# Patient Record
Sex: FEMALE | Race: ASIAN | NOT HISPANIC OR LATINO | Employment: UNEMPLOYED | URBAN - METROPOLITAN AREA
[De-identification: names, ages, dates, MRNs, and addresses within clinical notes are randomized per-mention and may not be internally consistent; named-entity substitution may affect disease eponyms.]

---

## 2024-01-12 ENCOUNTER — OFFICE VISIT (OUTPATIENT)
Dept: INTERNAL MEDICINE | Facility: CLINIC | Age: 49
End: 2024-01-12
Payer: COMMERCIAL

## 2024-01-12 VITALS
TEMPERATURE: 97.5 F | SYSTOLIC BLOOD PRESSURE: 104 MMHG | HEART RATE: 70 BPM | OXYGEN SATURATION: 99 % | WEIGHT: 151 LBS | BODY MASS INDEX: 25.78 KG/M2 | HEIGHT: 64 IN | DIASTOLIC BLOOD PRESSURE: 72 MMHG

## 2024-01-12 DIAGNOSIS — Z82.49 FAMILY HISTORY OF HEART DISEASE: ICD-10-CM

## 2024-01-12 DIAGNOSIS — E11.9 ENCOUNTER FOR DIABETIC FOOT EXAM: ICD-10-CM

## 2024-01-12 DIAGNOSIS — E53.8 VITAMIN B12 DEFICIENCY: ICD-10-CM

## 2024-01-12 DIAGNOSIS — Z12.4 ENCOUNTER FOR PAPANICOLAOU SMEAR OF CERVIX: ICD-10-CM

## 2024-01-12 DIAGNOSIS — E11.9 TYPE 2 DIABETES MELLITUS WITHOUT COMPLICATION, WITHOUT LONG-TERM CURRENT USE OF INSULIN: Primary | ICD-10-CM

## 2024-01-12 DIAGNOSIS — Z00.00 ANNUAL PHYSICAL EXAM: ICD-10-CM

## 2024-01-12 DIAGNOSIS — E61.2 MAGNESIUM DEFICIENCY: ICD-10-CM

## 2024-01-12 RX ORDER — EMPAGLIFLOZIN 10 MG/1
1 TABLET, FILM COATED ORAL DAILY
COMMUNITY

## 2024-01-12 RX ORDER — GLIPIZIDE 10 MG/1
TABLET, FILM COATED, EXTENDED RELEASE ORAL
COMMUNITY

## 2024-01-12 RX ORDER — NORETHINDRONE ACETATE AND ETHINYL ESTRADIOL, ETHINYL ESTRADIOL AND FERROUS FUMARATE 1MG-10(24)
KIT ORAL DAILY
COMMUNITY

## 2024-01-12 NOTE — PROGRESS NOTES
[facility]  [address]  [facility] [address]  Dept: [phone]  Dept Fax: [phone]  Loc: [phone]  Loc Fax: [phone]    Sada Ugalde  1975    New Patient Office Note    History of Present Illness:  Patient is a 48-year-old female in today to establish care for diabetes, vitamin B12 deficiency, and folate deficiency.  Patient on glipizide 10 mg extended release daily, Jardiance 10 mg daily, and metformin 1000 mg twice daily.  Taking as directed with any problems or side effects.  Blood sugars running normal.  Needing A1c rechecked.    Patient has a family history for cardiovascular disease.  Would like referred to cardiology for further evaluation.    Patient overall doing well and feeling well.        The following portions of the patient's history were reviewed and updated as appropriate: allergies, current medications, past family history, past medical history, past social history, past surgical history, and problem list.    Medications:    Current Outpatient Medications:     glipizide (GLUCOTROL XL) 10 MG 24 hr tablet, TAKE 1 TABLET BY MOUTH ONCE A DAY WITH BREAKFAST, Disp: , Rfl:     Jardiance 10 MG tablet tablet, Take 1 tablet by mouth Daily., Disp: , Rfl:     metFORMIN (GLUCOPHAGE) 1000 MG tablet, Take 1 tablet by mouth 2 (Two) Times a Day With Meals., Disp: , Rfl:     Norethin-Eth Estrad-Fe Biphas (Lo Loestrin Fe) 1 MG-10 MCG / 10 MCG tablet, Take  by mouth Daily., Disp: , Rfl:     Subjective  No Known Allergies     Past Medical History:   Diagnosis Date    Type 2 diabetes mellitus        Past Surgical History:   Procedure Laterality Date     SECTION         Family History   Problem Relation Age of Onset    Diabetes Father     Diabetes Brother         Social History     Socioeconomic History    Marital status:    Tobacco Use    Smoking status: Never    Smokeless tobacco: Never   Vaping Use    Vaping Use: Never  "used   Substance and Sexual Activity    Alcohol use: Yes     Alcohol/week: 1.0 standard drink of alcohol     Types: 1 Drinks containing 0.5 oz of alcohol per week     Comment: social    Drug use: Never    Sexual activity: Defer       Review of Systems   Constitutional:  Negative for activity change, chills, fatigue, fever and unexpected weight change.   HENT:  Negative for congestion, ear pain, postnasal drip, sinus pressure and sore throat.    Eyes:  Negative for pain, discharge and redness.   Respiratory:  Negative for cough, shortness of breath and wheezing.    Cardiovascular:  Negative for chest pain, palpitations and leg swelling.   Gastrointestinal:  Negative for diarrhea, nausea and vomiting.   Endocrine: Negative for cold intolerance and heat intolerance.   Genitourinary:  Negative for decreased urine volume and dysuria.   Musculoskeletal:  Negative for arthralgias and myalgias.   Skin:  Negative for rash and wound.   Neurological:  Negative for dizziness, light-headedness and headaches.   Hematological:  Does not bruise/bleed easily.   Psychiatric/Behavioral:  Negative for confusion, dysphoric mood and sleep disturbance. The patient is not nervous/anxious.        Objective  Vitals:    01/12/24 1120   BP: 104/72   BP Location: Left arm   Patient Position: Sitting   Cuff Size: Adult   Pulse: 70   Temp: 97.5 °F (36.4 °C)   TempSrc: Temporal   SpO2: 99%   Weight: 68.5 kg (151 lb)   Height: 162.6 cm (64\")       Physical Exam  Physical Exam  Vitals and nursing note reviewed.   Constitutional:       General: She is not in acute distress.     Appearance: She is not ill-appearing.   HENT:      Head: Normocephalic.      Right Ear: Tympanic membrane, ear canal and external ear normal. There is no impacted cerumen.      Left Ear: Tympanic membrane, ear canal and external ear normal. There is no impacted cerumen.      Nose: No congestion or rhinorrhea.      Mouth/Throat:      Mouth: Mucous membranes are moist.      " Pharynx: Oropharynx is clear. No oropharyngeal exudate or posterior oropharyngeal erythema.   Eyes:      General:         Right eye: No discharge.         Left eye: No discharge.      Extraocular Movements: Extraocular movements intact.      Conjunctiva/sclera: Conjunctivae normal.      Pupils: Pupils are equal, round, and reactive to light.   Cardiovascular:      Rate and Rhythm: Normal rate and regular rhythm.      Heart sounds: Normal heart sounds. No murmur heard.     No friction rub. No gallop.   Pulmonary:      Effort: Pulmonary effort is normal. No respiratory distress.      Breath sounds: Normal breath sounds. No wheezing.   Abdominal:      General: Bowel sounds are normal. There is no distension.      Palpations: Abdomen is soft. There is no mass.      Tenderness: There is no abdominal tenderness.   Musculoskeletal:         General: No swelling. Normal range of motion.      Cervical back: Normal range of motion. No tenderness.      Right lower leg: No edema.      Left lower leg: No edema.   Lymphadenopathy:      Cervical: No cervical adenopathy.   Skin:     Findings: No bruising, erythema or rash.   Neurological:      Mental Status: She is oriented to person, place, and time.      Gait: Gait normal.   Psychiatric:         Mood and Affect: Mood normal.         Behavior: Behavior normal.         Thought Content: Thought content normal.         Judgment: Judgment normal.         Diagnostic Data  Procedures    Assessment  Diagnoses and all orders for this visit:    1. Type 2 diabetes mellitus without complication, without long-term current use of insulin (Primary)  -     MicroAlbumin, Urine, Random - Urine, Clean Catch; Future    2. Vitamin B12 deficiency  -     Vitamin B12; Future  -     Folate; Future    3. Magnesium deficiency  -     Magnesium; Future    4. Family history of heart disease  -     Ambulatory Referral to Cardiology    5. Encounter for Papanicolaou smear of cervix  -     Ambulatory Referral to  Obstetrics / Gynecology    6. Annual physical exam  -     CBC (No Diff)  -     Comprehensive Metabolic Panel  -     TSH Rfx On Abnormal To Free T4  -     Hemoglobin A1c; Future  -     Lipid Panel  -     Hepatitis C Antibody    7. Encounter for diabetic foot exam        Plan    1. Type 2 diabetes mellitus without complication, without long-term current use of insulin (Primary)- on glipizide, Jardiance, metformin.  Refilled meds.  Repeat A1c.  Continue to monitor.    2. Vitamin B12 deficiency repeat vitamin B12 level.  Obtain folate level.    3. Magnesium deficiency repeat magnesium level.    4. Family history of heart disease- Ambulatory Referral to Cardiology    5. Encounter for Papanicolaou smear of cervix- Ambulatory Referral to Obstetrics / Gynecology    6. Annual physical exam- obtain fasting labs and follow-up with these.  Advised on nutrition and exercise and follow-up with this.    7.  Encounter for diabetic foot exam- completed today.  Within normal limits.      Return in about 3 months (around 4/12/2024) for Recheck.    Zaki Clay PA-C  01/12/2024

## 2024-01-15 RX ORDER — EMPAGLIFLOZIN 10 MG/1
10 TABLET, FILM COATED ORAL DAILY
Qty: 30 TABLET | Status: CANCELLED | OUTPATIENT
Start: 2024-01-15

## 2024-01-15 NOTE — TELEPHONE ENCOUNTER
Caller: Sada Ugalde    Relationship: Self    Best call back number: [phone]    Requested Prescriptions:   Requested Prescriptions     Pending Prescriptions Disp Refills    Jardiance 10 MG tablet tablet 30 tablet      Sig: Take 1 tablet by mouth Daily.        Pharmacy where request should be sent:    [facility] [phone]  Last office visit with prescribing clinician: 1/12/2024   Last telemedicine visit with prescribing clinician: Visit date not found   Next office visit with prescribing clinician: 4/12/2024     Additional details provided by patient: PATIENT TAKES 25MG BID AND NEEDS NEW SCRIPT SENT TO PHARMACY. PATIENT HAS 2 PILLS LEFT.     Does the patient have less than a 3 day supply:  [x] Yes  [] No    Would you like a call back once the refill request has been completed: [x] Yes [] No    If the office needs to give you a call back, can they leave a voicemail: [x] Yes [] No    Ese Atwood Rep   01/15/24 13:19 EST

## 2024-01-17 NOTE — TELEPHONE ENCOUNTER
PT CALLED THE OFFICE BACK AD WAS ADVISED SHE ONLY NEEDS TO BE TAKING 25MG INSTEAD OF 50MG A DAY. PT UNDERSTOOD, BUT WAS A LITTLE CONCERNED SINCE SHE HAS BEEN TAKING THE 50MG DAILY IN THE PAST. PT DOESN'T HAVE ANY SYMPTOMS, SO I TOLD HER TO JUST START TAKING THE 25MG AND SHE SHOULD BE FINE, BUT IF SHE STARTS HAVING ISSUES TO CALL OUR OFFICE BACK. PT UNDERSTOOD, JUST A FYI.

## 2024-01-30 ENCOUNTER — OFFICE VISIT (OUTPATIENT)
Dept: CARDIOLOGY | Facility: CLINIC | Age: 49
End: 2024-01-30
Payer: COMMERCIAL

## 2024-01-30 VITALS
SYSTOLIC BLOOD PRESSURE: 106 MMHG | HEART RATE: 77 BPM | BODY MASS INDEX: 26.12 KG/M2 | OXYGEN SATURATION: 98 % | HEIGHT: 64 IN | WEIGHT: 153 LBS | DIASTOLIC BLOOD PRESSURE: 68 MMHG

## 2024-01-30 DIAGNOSIS — E11.9 TYPE 2 DIABETES MELLITUS WITHOUT COMPLICATION, WITHOUT LONG-TERM CURRENT USE OF INSULIN: Primary | ICD-10-CM

## 2024-01-30 PROCEDURE — 99203 OFFICE O/P NEW LOW 30 MIN: CPT | Performed by: INTERNAL MEDICINE

## 2024-01-30 RX ORDER — LANCETS 33 GAUGE
EACH MISCELLANEOUS AS NEEDED
COMMUNITY
Start: 2023-11-06

## 2024-01-30 NOTE — PROGRESS NOTES
"[facility] Cardiology  Consultation H&P  Sada Ugalde  1975  [phone]  There is no work phone number on file..    VISIT DATE:  01/30/2024    PCP: Zaki Clay PA-C  [address] [place], [place] [address]    CC:  Chief Complaint   Patient presents with    Establish Care     New Patient         ASSESSMENT:   Diagnosis Plan   1. Type 2 diabetes mellitus without complication, without long-term current use of insulin              PLAN:  Type 2 diabetes mellitus: Currently well-controlled.  Agree with current medical therapy.  Afterload well-controlled.  Continue heart healthy diet and regular exercise.  Fasting lipid panel pending, goal LDL less than 100, goal HDL greater than 50, goal triglyceride less than 150.    History of Present Illness   48-year-old female with type 2 diabetes since 2008 and family history of early coronary disease in her father and paternal aunt.  Reports that most recent hemoglobin A1c 6.2.  Blood pressures running less than 130/80 mmHg.  Previous lipid profile not available for review, repeat labs pending with primary care physician in April.  Recently moved to [place] from [place].  Compliant with medical therapy.  , is able to exercise without any limitations.  Denies chest pain, palpitations or dyspnea on exertion.    PHYSICAL EXAMINATION:  Vitals:    01/30/24 1257   BP: 106/68   BP Location: Right arm   Patient Position: Sitting   Pulse: 77   SpO2: 98%   Weight: 69.4 kg (153 lb)   Height: 162.6 cm (64\")     General Appearance:    Alert, cooperative, no distress, appears stated age   Head:    Normocephalic, without obvious abnormality, atraumatic   Eyes:    conjunctiva/corneas clear, EOM's intact, fundi     benign, both eyes   Ears:    Normal TM's and external ear canals, both ears   Nose:   Nares normal, septum midline, mucosa normal, no drainage    or sinus tenderness   Throat:   Lips, mucosa, and tongue normal; " "teeth and gums normal   Neck:   Supple, symmetrical, trachea midline, no adenopathy;     thyroid:  no enlargement/tenderness/nodules; no carotid    bruit or JVD   Back:     Symmetric, no curvature, ROM normal, no CVA tenderness   Lungs:     Clear to auscultation bilaterally, respirations unlabored   Chest Wall:    No tenderness or deformity    Heart:    Regular rate and rhythm, S1 and S2 normal, no murmur, rub   or gallop, normal carotid impulse bilaterally without bruit.   Abdomen:     Soft, non-tender, bowel sounds active all four quadrants,     no masses, no organomegaly   Extremities:   Extremities normal, atraumatic, no cyanosis or edema   Pulses:   2+ and symmetric all extremities   Skin:   Skin color, texture, turgor normal, no rashes or lesions   Lymph nodes:   Cervical, supraclavicular, and axillary nodes normal   Neurologic:   normal strength, sensation intact     throughout       Diagnostic Data:  Procedures  No results found for: \"CHLPL\", \"TRIG\", \"HDL\", \"LDLDIRECT\"  No results found for: \"GLUCOSE\", \"BUN\", \"CREATININE\", \"NA\", \"K\", \"CL\", \"CO2\"  No results found for: \"HGBA1C\"  No results found for: \"WBC\", \"HGB\", \"HCT\", \"PLT\"    PROBLEM LIST:  Patient Active Problem List   Diagnosis    Type 2 diabetes mellitus without complication, without long-term current use of insulin       PAST MEDICAL HX  Past Medical History:   Diagnosis Date    Type 2 diabetes mellitus        Allergies  No Known Allergies    Current Medications    Current Outpatient Medications:     empagliflozin (Jardiance) 25 MG tablet tablet, Take 1 tablet by mouth Daily., Disp: 30 tablet, Rfl: 5    glipizide (GLUCOTROL XL) 10 MG 24 hr tablet, TAKE 1 TABLET BY MOUTH ONCE A DAY WITH BREAKFAST, Disp: , Rfl:     Lancets (OneTouch Delica Plus [id number]) misc, As Needed., Disp: , Rfl:     metFORMIN (GLUCOPHAGE) 1000 MG tablet, Take 1 tablet by mouth 2 (Two) Times a Day With Meals., Disp: , Rfl:     Norethin-Eth Estrad-Fe Biphas (Lo Loestrin Fe) 1 MG-10 " MCG / 10 MCG tablet, Take  by mouth Daily., Disp: , Rfl:          ROS  ROS      SOCIAL HX  Social History     Socioeconomic History    Marital status:    Tobacco Use    Smoking status: Never    Smokeless tobacco: Never    Tobacco comments:     Never   Vaping Use    Vaping Use: Never used   Substance and Sexual Activity    Alcohol use: Yes     Alcohol/week: 1.0 standard drink of alcohol     Types: 1 Drinks containing 0.5 oz of alcohol per week     Comment: Very socail maybe once a month    Drug use: Never    Sexual activity: Yes     Partners: Male     Birth control/protection: Birth control pill       FAMILY HX  Family History   Problem Relation Age of Onset    Cervical cancer Mother     Hypertension Mother         Had cervical cancer    Heart attack Father         Dad had a stroke ans then heart attack.    Diabetes Father     Stroke Father     Diabetes Brother     Heart attack Brother         Had high blood pressure/ Diabetes             Jose Vargas III, MD, FACC

## 2024-02-19 RX ORDER — GLIPIZIDE 10 MG/1
10 TABLET, FILM COATED, EXTENDED RELEASE ORAL DAILY
Qty: 90 TABLET | Refills: 1 | Status: SHIPPED | OUTPATIENT
Start: 2024-02-19

## 2024-02-19 RX ORDER — NORETHINDRONE ACETATE AND ETHINYL ESTRADIOL, ETHINYL ESTRADIOL AND FERROUS FUMARATE 1MG-10(24)
1 KIT ORAL DAILY
Qty: 28 TABLET | Refills: 1 | Status: SHIPPED | OUTPATIENT
Start: 2024-02-19

## 2024-02-19 NOTE — TELEPHONE ENCOUNTER
Caller: Sada Ugalde    Relationship: Self    Best call back number: [phone]    Requested Prescriptions:   Requested Prescriptions     Pending Prescriptions Disp Refills    Norethin-Eth Estrad-Fe Biphas (Lo Loestrin Fe) 1 MG-10 MCG / 10 MCG tablet 28 tablet      Sig: Take  by mouth Daily.    metFORMIN (GLUCOPHAGE) 1000 MG tablet       Sig: Take 1 tablet by mouth 2 (Two) Times a Day With Meals.    empagliflozin (Jardiance) 25 MG tablet tablet 30 tablet 5     Sig: Take 1 tablet by mouth Daily.    glipizide (GLUCOTROL XL) 10 MG 24 hr tablet          Pharmacy where request should be sent: [facility] [id number] [address] [facility] [phone] [facility] [phone] FX     Last office visit with prescribing clinician: 1/12/2024   Last telemedicine visit with prescribing clinician: Visit date not found   Next office visit with prescribing clinician: 4/12/2024     Additional details provided by patient: REQUESTING REFILLS; COMPLETELY OUT OF MEDICATIONS    Does the patient have less than a 3 day supply:  [x] Yes  [] No    Would you like a call back once the refill request has been completed: [x] Yes [] No    If the office needs to give you a call back, can they leave a voicemail: [x] Yes [] No    Ese Luque Rep   02/19/24 10:20 EST

## 2024-03-01 RX ORDER — NORETHINDRONE ACETATE AND ETHINYL ESTRADIOL, ETHINYL ESTRADIOL AND FERROUS FUMARATE 1MG-10(24)
1 KIT ORAL DAILY
Qty: 28 TABLET | Refills: 1 | OUTPATIENT
Start: 2024-03-01

## 2024-03-01 NOTE — TELEPHONE ENCOUNTER
Caller: Sada Ugalde    Relationship: Self    Best call back number: [phone]     Requested Prescriptions:   Requested Prescriptions     Pending Prescriptions Disp Refills    Norethin-Eth Estrad-Fe Biphas (Lo Loestrin Fe) 1 MG-10 MCG / 10 MCG tablet 28 tablet 1     Sig: Take 1 tablet by mouth Daily.    3 MO SUPPLY AND REFILLS    Pharmacy where request should be sent: [facility] [id number] [address] [facility] [phone] [facility] [phone] FX     Last office visit with prescribing clinician: 1/12/2024   Last telemedicine visit with prescribing clinician: Visit date not found   Next office visit with prescribing clinician: 4/12/2024     Additional details provided by patient: OUT OF MEDICATION    Does the patient have less than a 3 day supply:  [x] Yes  [] No    Would you like a call back once the refill request has been completed: [] Yes [x] No    If the office needs to give you a call back, can they leave a voicemail: [] Yes [x] No    Ese Jose Rep   03/01/24 10:50 EST

## 2024-03-06 ENCOUNTER — TELEPHONE (OUTPATIENT)
Dept: INTERNAL MEDICINE | Facility: CLINIC | Age: 49
End: 2024-03-06
Payer: COMMERCIAL

## 2024-03-06 RX ORDER — NORETHINDRONE ACETATE AND ETHINYL ESTRADIOL, ETHINYL ESTRADIOL AND FERROUS FUMARATE 1MG-10(24)
1 KIT ORAL DAILY
Qty: 30 TABLET | Refills: 2 | Status: SHIPPED | OUTPATIENT
Start: 2024-03-06 | End: 2024-06-04

## 2024-03-06 NOTE — TELEPHONE ENCOUNTER
Caller: Sada Ugalde    Relationship: Self    Best call back number: [phone]     Which medication are you concerned about: Norethin-Eth Estrad-Fe Biphas (Lo Loestrin Fe) 1 MG-10 MCG / 10 MCG tablet     Who prescribed you this medication: PAULINE        What are your concerns: PATIENT STATED THE ABOVE MEDICATION WAS SENT FOR 30 DAYS. SHE DID NOT PICK IT UP AND IS REQUESTING A NEW PRESCRIPTION SENT IN FOR 90 DAYS. PATIENT WANTS IT TO BE A RUSH AND CALLED IN TODAY

## 2024-04-17 ENCOUNTER — TELEPHONE (OUTPATIENT)
Dept: CARDIOLOGY | Facility: CLINIC | Age: 49
End: 2024-04-17
Payer: COMMERCIAL

## 2024-04-17 NOTE — TELEPHONE ENCOUNTER
04/17/2024 AT 8:11 AM    LVM FOR PT IN REGARDS TO THEIR FUTURE APPOINTMENT AND THAT IT IS TO BE RELOCATED TO THE NEW [place] ADDRESS.

## 2024-06-17 RX ORDER — GLIPIZIDE 10 MG/1
10 TABLET, FILM COATED, EXTENDED RELEASE ORAL DAILY
Qty: 90 TABLET | Refills: 3 | Status: SHIPPED | OUTPATIENT
Start: 2024-06-17 | End: 2024-06-21

## 2024-06-20 ENCOUNTER — LAB (OUTPATIENT)
Dept: INTERNAL MEDICINE | Facility: CLINIC | Age: 49
End: 2024-06-20
Payer: COMMERCIAL

## 2024-06-20 DIAGNOSIS — E11.9 TYPE 2 DIABETES MELLITUS WITHOUT COMPLICATION, WITHOUT LONG-TERM CURRENT USE OF INSULIN: ICD-10-CM

## 2024-06-20 DIAGNOSIS — E61.2 MAGNESIUM DEFICIENCY: ICD-10-CM

## 2024-06-20 DIAGNOSIS — Z00.00 ANNUAL PHYSICAL EXAM: ICD-10-CM

## 2024-06-20 DIAGNOSIS — E53.8 VITAMIN B12 DEFICIENCY: ICD-10-CM

## 2024-06-20 LAB
ALBUMIN SERPL-MCNC: 3.9 G/DL (ref 3.5–5.2)
ALBUMIN UR-MCNC: <1.2 MG/DL
ALBUMIN/GLOB SERPL: 1.3 G/DL
ALP SERPL-CCNC: 71 U/L (ref 39–117)
ALT SERPL W P-5'-P-CCNC: 21 U/L (ref 1–33)
ANION GAP SERPL CALCULATED.3IONS-SCNC: 8.6 MMOL/L (ref 5–15)
AST SERPL-CCNC: 13 U/L (ref 1–32)
BILIRUB SERPL-MCNC: 0.4 MG/DL (ref 0–1.2)
BUN SERPL-MCNC: 8 MG/DL (ref 6–20)
BUN/CREAT SERPL: 11.4 (ref 7–25)
CALCIUM SPEC-SCNC: 8.8 MG/DL (ref 8.6–10.5)
CHLORIDE SERPL-SCNC: 104 MMOL/L (ref 98–107)
CHOLEST SERPL-MCNC: 179 MG/DL (ref 0–200)
CO2 SERPL-SCNC: 25.4 MMOL/L (ref 22–29)
CREAT SERPL-MCNC: 0.7 MG/DL (ref 0.57–1)
DEPRECATED RDW RBC AUTO: 38.5 FL (ref 37–54)
EGFRCR SERPLBLD CKD-EPI 2021: 106.2 ML/MIN/1.73
ERYTHROCYTE [DISTWIDTH] IN BLOOD BY AUTOMATED COUNT: 12 % (ref 12.3–15.4)
FOLATE SERPL-MCNC: 14.8 NG/ML (ref 4.78–24.2)
GLOBULIN UR ELPH-MCNC: 2.9 GM/DL
GLUCOSE SERPL-MCNC: 94 MG/DL (ref 65–99)
HBA1C MFR BLD: 7.8 % (ref 4.8–5.6)
HCT VFR BLD AUTO: 43.8 % (ref 34–46.6)
HCV AB SER QL: NORMAL
HDLC SERPL-MCNC: 66 MG/DL (ref 40–60)
HGB BLD-MCNC: 14.3 G/DL (ref 12–15.9)
LDLC SERPL CALC-MCNC: 95 MG/DL (ref 0–100)
LDLC/HDLC SERPL: 1.42 {RATIO}
MAGNESIUM SERPL-MCNC: 2.2 MG/DL (ref 1.6–2.6)
MCH RBC QN AUTO: 29.1 PG (ref 26.6–33)
MCHC RBC AUTO-ENTMCNC: 32.6 G/DL (ref 31.5–35.7)
MCV RBC AUTO: 89 FL (ref 79–97)
PLATELET # BLD AUTO: 249 10*3/MM3 (ref 140–450)
PMV BLD AUTO: 10.4 FL (ref 6–12)
POTASSIUM SERPL-SCNC: 4.2 MMOL/L (ref 3.5–5.2)
PROT SERPL-MCNC: 6.8 G/DL (ref 6–8.5)
RBC # BLD AUTO: 4.92 10*6/MM3 (ref 3.77–5.28)
SODIUM SERPL-SCNC: 138 MMOL/L (ref 136–145)
TRIGL SERPL-MCNC: 98 MG/DL (ref 0–150)
TSH SERPL DL<=0.05 MIU/L-ACNC: 3.43 UIU/ML (ref 0.27–4.2)
VIT B12 BLD-MCNC: 1817 PG/ML (ref 211–946)
VLDLC SERPL-MCNC: 18 MG/DL (ref 5–40)
WBC NRBC COR # BLD AUTO: 7.02 10*3/MM3 (ref 3.4–10.8)

## 2024-06-20 PROCEDURE — 86803 HEPATITIS C AB TEST: CPT | Performed by: PHYSICIAN ASSISTANT

## 2024-06-20 PROCEDURE — 85027 COMPLETE CBC AUTOMATED: CPT | Performed by: PHYSICIAN ASSISTANT

## 2024-06-20 PROCEDURE — 82607 VITAMIN B-12: CPT | Performed by: PHYSICIAN ASSISTANT

## 2024-06-20 PROCEDURE — 36415 COLL VENOUS BLD VENIPUNCTURE: CPT | Performed by: PHYSICIAN ASSISTANT

## 2024-06-20 PROCEDURE — 80061 LIPID PANEL: CPT | Performed by: PHYSICIAN ASSISTANT

## 2024-06-20 PROCEDURE — 83036 HEMOGLOBIN GLYCOSYLATED A1C: CPT | Performed by: PHYSICIAN ASSISTANT

## 2024-06-20 PROCEDURE — 82746 ASSAY OF FOLIC ACID SERUM: CPT | Performed by: PHYSICIAN ASSISTANT

## 2024-06-20 PROCEDURE — 83735 ASSAY OF MAGNESIUM: CPT | Performed by: PHYSICIAN ASSISTANT

## 2024-06-20 PROCEDURE — 82043 UR ALBUMIN QUANTITATIVE: CPT | Performed by: PHYSICIAN ASSISTANT

## 2024-06-20 PROCEDURE — 84443 ASSAY THYROID STIM HORMONE: CPT | Performed by: PHYSICIAN ASSISTANT

## 2024-06-20 PROCEDURE — 80053 COMPREHEN METABOLIC PANEL: CPT | Performed by: PHYSICIAN ASSISTANT

## 2024-06-24 ENCOUNTER — OFFICE VISIT (OUTPATIENT)
Dept: INTERNAL MEDICINE | Facility: CLINIC | Age: 49
End: 2024-06-24
Payer: COMMERCIAL

## 2024-06-24 VITALS
TEMPERATURE: 96.8 F | HEART RATE: 70 BPM | DIASTOLIC BLOOD PRESSURE: 74 MMHG | OXYGEN SATURATION: 97 % | HEIGHT: 64 IN | BODY MASS INDEX: 26.63 KG/M2 | SYSTOLIC BLOOD PRESSURE: 110 MMHG | WEIGHT: 156 LBS

## 2024-06-24 DIAGNOSIS — M25.561 BILATERAL CHRONIC KNEE PAIN: Primary | ICD-10-CM

## 2024-06-24 DIAGNOSIS — E11.9 TYPE 2 DIABETES MELLITUS WITHOUT COMPLICATION, WITHOUT LONG-TERM CURRENT USE OF INSULIN: ICD-10-CM

## 2024-06-24 DIAGNOSIS — M25.562 BILATERAL CHRONIC KNEE PAIN: Primary | ICD-10-CM

## 2024-06-24 DIAGNOSIS — R79.89 LOW SERUM PROGESTERONE: ICD-10-CM

## 2024-06-24 DIAGNOSIS — G89.29 BILATERAL CHRONIC KNEE PAIN: Primary | ICD-10-CM

## 2024-06-24 PROCEDURE — 99214 OFFICE O/P EST MOD 30 MIN: CPT | Performed by: PHYSICIAN ASSISTANT

## 2024-06-24 NOTE — PROGRESS NOTES
[facility] PRIMARY CARE  [address]  [facility] [address]  Dept: [phone]  Dept Fax: [phone]  Loc: [phone]  Loc Fax: [phone]    Sada Ugalde  1975    Follow Up Office Visit Note    History of Present Illness:  Patient is a 49-year-old female in today for bilateral knee pain.  Pain has been going on for some time but worse over the last month or so.  Denies any injury.    Joint Swelling  Associated symptoms include arthralgias and joint swelling. Pertinent negatives include no chest pain, chills, congestion, coughing, fatigue, fever, headaches, myalgias, nausea, rash, sore throat or vomiting.   Diabetes  She has type 2 diabetes mellitus. No MedicAlert identification noted. The initial diagnosis of diabetes was made 2008 years ago. Hypoglycemia symptoms include sweats. Pertinent negatives for hypoglycemia include no confusion, dizziness, headaches, nervousness/anxiousness or speech difficulty. Pertinent negatives for diabetes include no blurred vision, no chest pain, no fatigue, no foot paresthesias, no foot ulcerations, no polydipsia, no polyuria and no weight loss. Symptoms are stable. She is compliant with treatment all of the time. She is following a generally healthy diet. Meal planning includes avoidance of concentrated sweets. She participates in exercise five times a week. She monitors blood glucose at home 1-2 x per day. Her highest blood glucose is 130-140 mg/dl. She does not see a podiatrist. Eye exam is current.       The following portions of the patient's history were reviewed and updated as appropriate: allergies, current medications, past family history, past medical history, past social history, past surgical history, and problem list.    Medications:    Current Outpatient Medications:     empagliflozin (Jardiance) 25 MG tablet tablet, Take 1 tablet by mouth Daily., Disp: 30 tablet, Rfl: 5    Lancets (OneTouch Delica Plus  [id number]) misc, As Needed., Disp: , Rfl:     metFORMIN (GLUCOPHAGE) 1000 MG tablet, TAKE 1 TABLET BY MOUTH TWICE  DAILY WITH A MEAL, Disp: 180 tablet, Rfl: 3    Tirzepatide (MOUNJARO) 2.5 MG/0.5ML solution pen-injector pen, Inject 0.5 mL under the skin into the appropriate area as directed 1 (One) Time Per Week., Disp: 2 mL, Rfl: 1    Subjective  No Known Allergies     Past Medical History:   Diagnosis Date    Type 2 diabetes mellitus        Past Surgical History:   Procedure Laterality Date    BACK SURGERY  2010     SECTION         Family History   Problem Relation Age of Onset    Cervical cancer Mother     Hypertension Mother         Had cervical cancer    Cancer Mother         She dies od cervical cancer.    Heart attack Father         Dad had a stroke ans then heart attack.    Diabetes Father     Stroke Father     Diabetes Brother     Heart attack Brother         Had high blood pressure/ Diabetes        Social History     Socioeconomic History    Marital status:    Tobacco Use    Smoking status: Never    Smokeless tobacco: Never    Tobacco comments:     Never   Vaping Use    Vaping status: Never Used   Substance and Sexual Activity    Alcohol use: Yes     Alcohol/week: 1.0 standard drink of alcohol     Types: 1 Drinks containing 0.5 oz of alcohol per week     Comment: Very socail maybe once a month    Drug use: Never    Sexual activity: Yes     Partners: Male     Birth control/protection: Birth control pill       Review of Systems   Constitutional:  Negative for activity change, chills, fatigue, fever, unexpected weight change and weight loss.   HENT:  Negative for congestion, ear pain, postnasal drip, sinus pressure and sore throat.    Eyes:  Negative for blurred vision, pain, discharge and redness.   Respiratory:  Negative for cough, shortness of breath and wheezing.    Cardiovascular:  Negative for chest pain, palpitations and leg swelling.   Gastrointestinal:  Negative for diarrhea, nausea  "and vomiting.   Endocrine: Negative for cold intolerance, heat intolerance, polydipsia and polyuria.   Genitourinary:  Negative for decreased urine volume and dysuria.   Musculoskeletal:  Positive for arthralgias and joint swelling. Negative for myalgias.   Skin:  Negative for rash and wound.   Neurological:  Negative for dizziness, speech difficulty, light-headedness and headaches.   Hematological:  Does not bruise/bleed easily.   Psychiatric/Behavioral:  Negative for confusion, dysphoric mood and sleep disturbance. The patient is not nervous/anxious.          Objective  Vitals:    06/24/24 1325   BP: 110/74   BP Location: Left arm   Patient Position: Sitting   Cuff Size: Adult   Pulse: 70   Temp: 96.8 °F (36 °C)   TempSrc: Temporal   SpO2: 97%   Weight: 70.8 kg (156 lb)   Height: 162.6 cm (64.02\")     Body mass index is 26.76 kg/m².     Physical Exam  Physical Exam  Vitals and nursing note reviewed.   Constitutional:       General: She is not in acute distress.     Appearance: She is not ill-appearing.   HENT:      Head: Normocephalic.      Right Ear: Tympanic membrane, ear canal and external ear normal. There is no impacted cerumen.      Left Ear: Tympanic membrane, ear canal and external ear normal. There is no impacted cerumen.      Nose: No congestion or rhinorrhea.      Mouth/Throat:      Mouth: Mucous membranes are moist.      Pharynx: Oropharynx is clear. No oropharyngeal exudate or posterior oropharyngeal erythema.   Eyes:      General:         Right eye: No discharge.         Left eye: No discharge.      Extraocular Movements: Extraocular movements intact.      Conjunctiva/sclera: Conjunctivae normal.      Pupils: Pupils are equal, round, and reactive to light.   Cardiovascular:      Rate and Rhythm: Normal rate and regular rhythm.      Heart sounds: Normal heart sounds. No murmur heard.     No friction rub. No gallop.   Pulmonary:      Effort: Pulmonary effort is normal. No respiratory distress.      " Breath sounds: Normal breath sounds. No wheezing.   Abdominal:      General: Bowel sounds are normal. There is no distension.      Palpations: Abdomen is soft. There is no mass.      Tenderness: There is no abdominal tenderness.   Musculoskeletal:         General: Tenderness present. No swelling. Normal range of motion.      Cervical back: Normal range of motion. No tenderness.      Right lower leg: No edema.      Left lower leg: No edema.   Lymphadenopathy:      Cervical: No cervical adenopathy.   Skin:     Findings: No bruising, erythema or rash.   Neurological:      Mental Status: She is oriented to person, place, and time.      Gait: Gait normal.   Psychiatric:         Mood and Affect: Mood normal.         Behavior: Behavior normal.         Thought Content: Thought content normal.         Judgment: Judgment normal.         Diagnostic Data  Procedures    Assessment  Diagnoses and all orders for this visit:    1. Bilateral chronic knee pain (Primary)  -     Ambulatory Referral to Orthopedic Surgery    2. Type 2 diabetes mellitus without complication, without long-term current use of insulin    3. Low serum progesterone        Plan    1. Bilateral chronic knee pain (Primary)- worse, referred to orthopedics.    2. Type 2 diabetes mellitus without complication, without long-term current use of insulin- on metformin, Jardiance, and Mounjaro.    3. Low serum progesterone- on progesterone replacement therapy.      Return in about 3 months (around 9/24/2024) for Recheck.    Zaki Clay PA-C  06/24/2024  Answers submitted by the patient for this visit:  Primary Reason for Visit (Submitted on 6/20/2024)  What is the primary reason for your visit?: Diabetes  Diabetes Questionnaire (Submitted on 6/20/2024)  Chief Complaint: Diabetes problem  Below 70: occasionally

## 2024-06-28 ENCOUNTER — OFFICE VISIT (OUTPATIENT)
Dept: ORTHOPEDIC SURGERY | Facility: CLINIC | Age: 49
End: 2024-06-28
Payer: COMMERCIAL

## 2024-06-28 VITALS
SYSTOLIC BLOOD PRESSURE: 120 MMHG | HEIGHT: 64 IN | DIASTOLIC BLOOD PRESSURE: 82 MMHG | WEIGHT: 156.8 LBS | BODY MASS INDEX: 26.77 KG/M2

## 2024-06-28 DIAGNOSIS — M25.562 PAIN IN BOTH KNEES, UNSPECIFIED CHRONICITY: Primary | ICD-10-CM

## 2024-06-28 DIAGNOSIS — M25.561 PAIN IN BOTH KNEES, UNSPECIFIED CHRONICITY: Primary | ICD-10-CM

## 2024-06-28 NOTE — PROGRESS NOTES
[facility] [place] [facility]        Subjective     Pain of the Left Knee and Pain of the Right Knee      HPI    Sada Ugalde is a 49 y.o. female. ***     ***      Past Medical History:   Diagnosis Date    Knee sprain     Lumbosacral disc disease Back sx done    Type 2 diabetes mellitus       Past Surgical History:   Procedure Laterality Date    BACK SURGERY       SECTION        Family History   Problem Relation Age of Onset    Cervical cancer Mother     Hypertension Mother         Had cervical cancer    Cancer Mother         She dies od cervical cancer.    Heart attack Father         Dad had a stroke ans then heart attack.    Diabetes Father     Stroke Father     Diabetes Brother     Heart attack Brother         Had high blood pressure/ Diabetes     Social History     Socioeconomic History    Marital status:    Tobacco Use    Smoking status: Never     Passive exposure: Past    Smokeless tobacco: Never    Tobacco comments:     Never   Vaping Use    Vaping status: Never Used   Substance and Sexual Activity    Alcohol use: Yes     Alcohol/week: 1.0 standard drink of alcohol     Types: 1 Glasses of wine per week     Comment: Very socail maybe once a month    Drug use: Never    Sexual activity: Yes     Partners: Male     Birth control/protection: Birth control pill, Same-sex partner      Current Outpatient Medications on File Prior to Visit   Medication Sig Dispense Refill    empagliflozin (Jardiance) 25 MG tablet tablet Take 1 tablet by mouth Daily. 30 tablet 5    Lancets (OneTouch Delica Plus [id number]) misc As Needed.      metFORMIN (GLUCOPHAGE) 1000 MG tablet TAKE 1 TABLET BY MOUTH TWICE  DAILY WITH A MEAL 180 tablet 3    Tirzepatide (MOUNJARO) 2.5 MG/0.5ML solution pen-injector pen Inject 0.5 mL under the skin into the appropriate area as directed 1 (One) Time Per Week. 2 mL 1     No current facility-administered medications on file prior to visit.      No Known Allergies  "      Review of Systems     I reviewed the patient's chief complaint, history of present illness, review of systems, past medical history, surgical history, family history, social history, medications and allergy list.        Objective      Physical Exam  /82   Ht 162.6 cm (64.02\")   Wt 71.1 kg (156 lb 12.8 oz)   BMI 26.90 kg/m²     Body mass index is 26.9 kg/m².    General  Mental Status - alert  General Appearance - cooperative, well groomed, not in acute distress  Orientation - Oriented X3  Build & Nutrition - well developed and well nourished  Posture - normal posture  Gait - normal gait       Ortho Exam  ***    Imaging/Studies Reviewed and Interpreted:  Imaging Results (Last 24 Hours)       ** No results found for the last 24 hours. **              Assessment    Assessment:  No diagnosis found.    Plan:  Continue over-the-counter medication as needed for discomfort  ***        Mela Kemp MA  06/28/24  10:53 EDT      Dictated Utilizing Dragon Dictation.    "

## 2024-07-01 NOTE — PROGRESS NOTES
Patient left without being seen today due to issues with xrays that had been taken at another facility. Patient will reschedule at a time convenient for her.

## 2024-07-10 ENCOUNTER — OFFICE VISIT (OUTPATIENT)
Age: 49
End: 2024-07-10
Payer: COMMERCIAL

## 2024-07-10 VITALS
BODY MASS INDEX: 26.76 KG/M2 | HEIGHT: 64 IN | DIASTOLIC BLOOD PRESSURE: 60 MMHG | SYSTOLIC BLOOD PRESSURE: 110 MMHG | WEIGHT: 156.75 LBS

## 2024-07-10 DIAGNOSIS — M25.562 CHRONIC PAIN OF BOTH KNEES: Primary | ICD-10-CM

## 2024-07-10 DIAGNOSIS — G89.29 CHRONIC PAIN OF BOTH KNEES: Primary | ICD-10-CM

## 2024-07-10 DIAGNOSIS — M25.561 CHRONIC PAIN OF BOTH KNEES: Primary | ICD-10-CM

## 2024-07-10 PROCEDURE — 99203 OFFICE O/P NEW LOW 30 MIN: CPT | Performed by: ORTHOPAEDIC SURGERY

## 2024-07-10 RX ORDER — PROGESTERONE 100 MG/1
100 CAPSULE ORAL DAILY
COMMUNITY
Start: 2024-06-01

## 2024-07-10 NOTE — PROGRESS NOTES
[facility] [place] [facility] Note        Subjective     Pain of the Left Knee and Pain of the Right Knee      HPI    Sada Ugalde is a 49 y.o. female.  Bilateral knee pain for 2 years.  She is a .  She is working full duty.  She had a cortisone injection in her knees 2 weeks ago.  It helped with swelling.  She started physical therapy yesterday.  She uses diclofenac gel.    Past Medical History:   Diagnosis Date    Knee sprain     Lumbosacral disc disease Back sx done    Type 2 diabetes mellitus       Past Surgical History:   Procedure Laterality Date    BACK SURGERY       SECTION        Family History   Problem Relation Age of Onset    Cervical cancer Mother     Hypertension Mother         Had cervical cancer    Cancer Mother         She dies od cervical cancer.    Heart attack Father         Dad had a stroke ans then heart attack.    Diabetes Father     Stroke Father     Diabetes Brother     Heart attack Brother         Had high blood pressure/ Diabetes     Social History     Socioeconomic History    Marital status:    Tobacco Use    Smoking status: Never     Passive exposure: Past    Smokeless tobacco: Never    Tobacco comments:     Never   Vaping Use    Vaping status: Never Used   Substance and Sexual Activity    Alcohol use: Yes     Alcohol/week: 1.0 standard drink of alcohol     Types: 1 Glasses of wine per week     Comment: Very socail maybe once a month    Drug use: Never    Sexual activity: Yes     Partners: Male     Birth control/protection: Birth control pill, Same-sex partner      Current Outpatient Medications on File Prior to Visit   Medication Sig Dispense Refill    empagliflozin (Jardiance) 25 MG tablet tablet Take 1 tablet by mouth Daily. 30 tablet 5    Lancets (OneTouch Delica Plus [id number]) misc As Needed.      metFORMIN (GLUCOPHAGE) 1000 MG tablet TAKE 1 TABLET BY MOUTH TWICE  DAILY WITH A MEAL 180 tablet 3    Progesterone (PROMETRIUM) 100 MG  "capsule Take 1 capsule by mouth Daily.      Tirzepatide (MOUNJARO) 2.5 MG/0.5ML solution pen-injector pen Inject 0.5 mL under the skin into the appropriate area as directed 1 (One) Time Per Week. (Patient not taking: Reported on 7/10/2024) 2 mL 1     No current facility-administered medications on file prior to visit.      No Known Allergies       Review of Systems   Constitutional: Negative.    HENT: Negative.     Eyes: Negative.    Respiratory: Negative.     Cardiovascular: Negative.    Gastrointestinal: Negative.    Endocrine: Negative.    Genitourinary: Negative.    Musculoskeletal:  Positive for arthralgias.   Skin: Negative.    Allergic/Immunologic: Negative.    Neurological: Negative.    Hematological: Negative.    Psychiatric/Behavioral: Negative.          I reviewed the patient's chief complaint, history of present illness, review of systems, past medical history, surgical history, family history, social history, medications and allergy list.        Objective      Physical Exam  /60   Ht 162.6 cm (64.02\")   Wt 71.1 kg (156 lb 12 oz)   BMI 26.89 kg/m²     Body mass index is 26.89 kg/m².    General  Mental Status - alert  General Appearance - cooperative, well groomed, not in acute distress  Orientation - Oriented X3  Build & Nutrition - well developed and well nourished  Posture - normal posture  Gait - normal gait       Ortho Exam  Trace effusion left knee.  Medial joint line tenderness to the right knee.  Equivocal Nelly.  Stable ligaments.  Full knee range of motion.    Imaging/Studies Reviewed and Interpreted:  Imaging Results (Last 24 Hours)       ** No results found for the last 24 hours. **          I viewed her bilateral knee x-rays from 2 weeks ago which are negative for acute changes.  She does have some osteophytes in her quadriceps tendon insertion on the patella    Assessment    Assessment:  1. Chronic pain of both knees        Plan:  Continue over-the-counter medication as needed " for discomfort  She would like MRIs of her knees.  She will continue physical therapy at [facility].  I have ordered MRIs of each knee.  I will see her back after the MRIs.  I explained insurance may not approve MRI without 6 weeks of therapy but I am happy to order them for her.        Mick Bryan MD  07/10/24  09:10 EDT      Dictated Utilizing Dragon Dictation.

## 2024-07-11 RX ORDER — EMPAGLIFLOZIN 25 MG/1
25 TABLET, FILM COATED ORAL DAILY
Qty: 90 TABLET | Refills: 3 | Status: SHIPPED | OUTPATIENT
Start: 2024-07-11

## 2024-07-16 ENCOUNTER — TELEPHONE (OUTPATIENT)
Dept: INTERNAL MEDICINE | Facility: CLINIC | Age: 49
End: 2024-07-16

## 2024-07-16 NOTE — TELEPHONE ENCOUNTER
Spoke with patient and notified her that we did not cancel the PA. I will get it submitted to insurance and let her know the determination.

## 2024-07-16 NOTE — TELEPHONE ENCOUNTER
Caller: Sada Ugalde    Relationship to patient: Self    Best call back number: [phone]     PATIENT STATES THAT BR[facility] WAS PUTTING HER ON MOUNJARO AND IT NEEDED A PRIOR AUTHORIZATION. THE PHARMACY SAID THAT HE CANCELLED IT AND WANTED TO ASK WHY.

## 2024-09-30 ENCOUNTER — TELEPHONE (OUTPATIENT)
Dept: INTERNAL MEDICINE | Facility: CLINIC | Age: 49
End: 2024-09-30

## 2024-09-30 ENCOUNTER — TELEPHONE (OUTPATIENT)
Dept: INTERNAL MEDICINE | Facility: CLINIC | Age: 49
End: 2024-09-30
Payer: COMMERCIAL

## 2024-09-30 NOTE — TELEPHONE ENCOUNTER
Caller: Sada Ugalde    Relationship: Self    Best call back number: [phone]     Who is your current provider: BRYANNA CARPENTER     Is your current provider offboarding? NO    Who would you like your new provider to be: DR MOCTEZUMA    What are your reasons for transferring care: LIKE DR MOCTEZUMA CARE BETTER

## 2024-09-30 NOTE — TELEPHONE ENCOUNTER
Caller: Sada Ugalde    Relationship: Self    Best call back number: [phone]     What orders are you requesting (i.e. lab or imaging): LABS     In what timeframe would the patient need to come in: BEFORE NEXT APPT    Where will you receive your lab/imaging services: OFFICE

## 2024-09-30 NOTE — TELEPHONE ENCOUNTER
Patient would like orders for lab work to be placed as she wants to complete them prior to her edel.

## 2024-10-08 ENCOUNTER — TELEPHONE (OUTPATIENT)
Dept: INTERNAL MEDICINE | Facility: CLINIC | Age: 49
End: 2024-10-08

## 2024-10-08 NOTE — TELEPHONE ENCOUNTER
Caller: Sada Ugalde    Relationship: Self    Best call back number:      [phone] (Mobile)     What orders are you requesting (i.e. lab or imaging):  ORDER FOR BLOOD WORK     In what timeframe would the patient need to come in:  BEFORE NEXT APPOINTMENT ON 10-23-24    Where will you receive your lab/imaging services:   [facility]ICE     Additional notes:  PLEASE CALL WHEN ORDER IS IN THE COMPUTER

## 2024-10-09 DIAGNOSIS — E11.9 TYPE 2 DIABETES MELLITUS WITHOUT COMPLICATION, WITHOUT LONG-TERM CURRENT USE OF INSULIN: Primary | ICD-10-CM

## 2024-11-04 ENCOUNTER — TELEPHONE (OUTPATIENT)
Dept: INTERNAL MEDICINE | Facility: CLINIC | Age: 49
End: 2024-11-04
Payer: COMMERCIAL

## 2024-11-25 ENCOUNTER — LAB (OUTPATIENT)
Dept: INTERNAL MEDICINE | Facility: CLINIC | Age: 49
End: 2024-11-25
Payer: COMMERCIAL

## 2024-11-25 DIAGNOSIS — E11.9 TYPE 2 DIABETES MELLITUS WITHOUT COMPLICATION, WITHOUT LONG-TERM CURRENT USE OF INSULIN: ICD-10-CM

## 2024-11-25 LAB
ANION GAP SERPL CALCULATED.3IONS-SCNC: 11 MMOL/L (ref 5–15)
BUN SERPL-MCNC: 15 MG/DL (ref 6–20)
BUN/CREAT SERPL: 20.8 (ref 7–25)
CALCIUM SPEC-SCNC: 9.2 MG/DL (ref 8.6–10.5)
CHLORIDE SERPL-SCNC: 101 MMOL/L (ref 98–107)
CO2 SERPL-SCNC: 24 MMOL/L (ref 22–29)
CREAT SERPL-MCNC: 0.72 MG/DL (ref 0.57–1)
EGFRCR SERPLBLD CKD-EPI 2021: 102.6 ML/MIN/1.73
GLUCOSE SERPL-MCNC: 190 MG/DL (ref 65–99)
HBA1C MFR BLD: 9.4 % (ref 4.8–5.6)
POTASSIUM SERPL-SCNC: 4.1 MMOL/L (ref 3.5–5.2)
SODIUM SERPL-SCNC: 136 MMOL/L (ref 136–145)

## 2024-11-25 PROCEDURE — 80048 BASIC METABOLIC PNL TOTAL CA: CPT | Performed by: INTERNAL MEDICINE

## 2024-11-25 PROCEDURE — 36415 COLL VENOUS BLD VENIPUNCTURE: CPT | Performed by: INTERNAL MEDICINE

## 2024-11-25 PROCEDURE — 83036 HEMOGLOBIN GLYCOSYLATED A1C: CPT | Performed by: INTERNAL MEDICINE

## 2024-11-26 DIAGNOSIS — E11.9 TYPE 2 DIABETES MELLITUS WITHOUT COMPLICATION, WITHOUT LONG-TERM CURRENT USE OF INSULIN: Primary | ICD-10-CM

## 2024-11-26 RX ORDER — BLOOD SUGAR DIAGNOSTIC
STRIP MISCELLANEOUS
Qty: 100 EACH | Refills: 5 | Status: SHIPPED | OUTPATIENT
Start: 2024-11-26

## 2024-11-26 RX ORDER — LANCETS 30 GAUGE
EACH MISCELLANEOUS
Qty: 100 EACH | Refills: 5 | Status: SHIPPED | OUTPATIENT
Start: 2024-11-26

## 2024-12-10 NOTE — TELEPHONE ENCOUNTER
Caller: Sada Ugalde    Relationship: Self    Best call back number: [phone]     Requested Prescriptions:   Requested Prescriptions     Pending Prescriptions Disp Refills    empagliflozin (Jardiance) 25 MG tablet tablet 90 tablet 3     Sig: Take 1 tablet by mouth Daily.        Pharmacy where request should be sent: [facility] [id number] [address] [facility] [phone] [facility] [phone]      Last office visit with prescribing clinician: Visit date not found   Last telemedicine visit with prescribing clinician: Visit date not found   Next office visit with prescribing clinician: 3/27/2025     Additional details provided by patient: PATIENT IS OUT    Does the patient have less than a 3 day supply:  [x] Yes  [] No    Would you like a call back once the refill request has been completed: [] Yes [x] No    If the office needs to give you a call back, can they leave a voicemail: [x] Yes [] No    Ese Bailon Rep   12/10/24 16:09 EST

## 2025-01-08 DIAGNOSIS — E11.9 TYPE 2 DIABETES MELLITUS WITHOUT COMPLICATION, WITHOUT LONG-TERM CURRENT USE OF INSULIN: Primary | ICD-10-CM

## 2025-01-08 RX ORDER — BLOOD-GLUCOSE METER
1 KIT MISCELLANEOUS AS NEEDED
Qty: 1 EACH | Refills: 0 | Status: SHIPPED | OUTPATIENT
Start: 2025-01-08

## 2025-02-06 DIAGNOSIS — Z12.31 ENCOUNTER FOR SCREENING MAMMOGRAM FOR MALIGNANT NEOPLASM OF BREAST: Primary | ICD-10-CM

## 2025-02-19 ENCOUNTER — HOSPITAL ENCOUNTER (OUTPATIENT)
Facility: HOSPITAL | Age: 50
Discharge: HOME | End: 2025-02-19
Admitting: INTERNAL MEDICINE
Payer: COMMERCIAL

## 2025-02-19 DIAGNOSIS — Z12.31 ENCOUNTER FOR SCREENING MAMMOGRAM FOR MALIGNANT NEOPLASM OF BREAST: ICD-10-CM

## 2025-02-19 LAB
NCCN CRITERIA FLAG: NORMAL
TYRER CUZICK SCORE: 10

## 2025-02-19 PROCEDURE — 77067 SCR MAMMO BI INCL CAD: CPT

## 2025-02-19 PROCEDURE — 77063 BREAST TOMOSYNTHESIS BI: CPT

## 2025-03-07 ENCOUNTER — OFFICE VISIT (OUTPATIENT)
Dept: ENDOCRINOLOGY | Facility: CLINIC | Age: 50
End: 2025-03-07
Payer: COMMERCIAL

## 2025-03-07 VITALS
WEIGHT: 145 LBS | BODY MASS INDEX: 24.75 KG/M2 | OXYGEN SATURATION: 96 % | HEART RATE: 89 BPM | SYSTOLIC BLOOD PRESSURE: 114 MMHG | HEIGHT: 64 IN | DIASTOLIC BLOOD PRESSURE: 76 MMHG

## 2025-03-07 DIAGNOSIS — E11.65 TYPE 2 DIABETES MELLITUS WITH HYPERGLYCEMIA, WITHOUT LONG-TERM CURRENT USE OF INSULIN: Primary | ICD-10-CM

## 2025-03-07 LAB
EXPIRATION DATE: ABNORMAL
EXPIRATION DATE: ABNORMAL
GLUCOSE BLDC GLUCOMTR-MCNC: 159 MG/DL (ref 70–130)
HBA1C MFR BLD: 7.4 % (ref 4.5–5.7)
Lab: ABNORMAL
Lab: ABNORMAL

## 2025-03-07 PROCEDURE — 82043 UR ALBUMIN QUANTITATIVE: CPT | Performed by: PHYSICIAN ASSISTANT

## 2025-03-07 PROCEDURE — 99204 OFFICE O/P NEW MOD 45 MIN: CPT | Performed by: PHYSICIAN ASSISTANT

## 2025-03-07 PROCEDURE — 86341 ISLET CELL ANTIBODY: CPT | Performed by: PHYSICIAN ASSISTANT

## 2025-03-07 PROCEDURE — 82570 ASSAY OF URINE CREATININE: CPT | Performed by: PHYSICIAN ASSISTANT

## 2025-03-07 PROCEDURE — 36415 COLL VENOUS BLD VENIPUNCTURE: CPT | Performed by: PHYSICIAN ASSISTANT

## 2025-03-07 PROCEDURE — 84681 ASSAY OF C-PEPTIDE: CPT | Performed by: PHYSICIAN ASSISTANT

## 2025-03-07 PROCEDURE — 82947 ASSAY GLUCOSE BLOOD QUANT: CPT | Performed by: PHYSICIAN ASSISTANT

## 2025-03-07 NOTE — Clinical Note
March 7, 2025     Patient: Sada Ugalde   YOB: 1975   Date of Visit: 3/7/2025       To Whom It May Concern:    It is my medical opinion that Sada Ugalde {Work release (duty restriction):45134}.           Sincerely,        Jennifer Irizarry PA-C    CC: No Recipients

## 2025-03-07 NOTE — ASSESSMENT & PLAN NOTE
Diabetes is not controlled.  A1C is 7.4% today    Discussed increasing Mounjaro; defers at this time given recent dose change.    Encouraged to continue blood sugar monitoring.  Rx: Continue Mounjaro 5 mg weekly, Jardiance 25 mg daily, metformin 1000 mg 1 tablet twice daily.    Foot exam; performed today without sensation loss or ulceration.   Microalbumin:cr; ordered today  Eye exam; encouraged yearly  LDL at goal <100  BP at goal <130/80    Cautioned risk for hypoglycemia; advised glucose supplement as needed.    Discussed complications associated with diabetes.   Discussed BG goals  fasting, <180 2-hours postprandial.   Encouraged adherence with taking medications;    Encouraged continued BG monitoring.   Encouraged continued effort toward lifestyle management:         Increase lean protein and vegetable intake  Avoid concentrated sugar drinks, such as sodas, and processed carbs including crackers, cookies, cakes  Routine physical activity.

## 2025-03-07 NOTE — LETTER
March 7, 2025     Patient: Sada Ugalde   YOB: 1975   Date of Visit: 3/7/2025       To Whom It May Concern:    Sada Ugalde is currently taking Mounjaro (Tripeptide) 5 mg once weekly for management of diabetes. Please allow for bringing medication and blood sugar testing supplies with her during times of travel to avoid complications.         Sincerely,        Jennifer Irizarry PA-C    CC: No Recipients

## 2025-03-07 NOTE — PROGRESS NOTES
Chief Complaint   Patient presents with    Diabetes        HPI  Sada Ugalde is a 49 y.o. female presents today for evaluation of type II diabetes. Referring Provider: Kevin Ang DO.     A1c 9.4% 11/25/2024.  Started Mounjaro at that time.  She increased to 5 mg about 1 month ago.  She is also taking Jardiance and metformin.  Taking medications as prescribed and tolerating well.    - BG at home: 120s; 140-160    -Denies hypoglycemia.   -Denies vision changes.   -Denies numbness.  Denies open sores.   -Denies heartburn/reflux, constipation, diarrhea, abdominal pain.  -Denies increased thirst or urination.   -Denies burning with urination.   -Denies SOB, chest pain.    LMP: 4 years ago;   HRT: Managed by [facility].  Taking progesterone, topical estradiol and testosterone, magnesium, K2/D2, omega 3, D3 (started around 2024)     Type 2 Diabetes:   Diagnosed with diabetes: 2008; worse control after pregnancies   Complications: denies    Current regimen includes: Mounjaro, Jardiance 25 mg, metformin 1000 mg 1 tablet twice daily  Previous medications: glipizide   Compliance with medications is good.    -Diabetes education/nutritionist: completed in past    Denies history of pancreatitis.  Denies family history of MENs, thyroid cancers.   Admits family history of diabetes.   Denies family history of thyroid disease.      DM Health Maintenance:  Ophthalmology: 5/2024   Monofilament / Foot exam: performed today  Urine microalbumin: cr: needs completed  GFR: 102, 11/25/2024  LDL: 95, triglyceride: 98, 6/20/2024    Social Hx:  Diet: Meals: 3x/day; eating more meals at home; working on portion control; mostly whole foods  Exercise: active most days; [employer] instructor     The following portions of the patient's history were reviewed and updated as appropriate: allergies, current medications, past family history, past medical history, past social history, past surgical history and problem list.     Past Medical  History:   Diagnosis Date    Knee sprain     Lumbosacral disc disease Back sx done    Type 2 diabetes mellitus      Past Surgical History:   Procedure Laterality Date    BACK SURGERY       SECTION        Family History   Problem Relation Age of Onset    Cervical cancer Mother     Hypertension Mother         Had cervical cancer    Cancer Mother         She dies od cervical cancer.    Heart attack Father         Dad had a stroke ans then heart attack.    Diabetes Father     Stroke Father     Diabetes Brother     Heart attack Brother         Had high blood pressure/ Diabetes    Breast cancer Neg Hx       Social History     Socioeconomic History    Marital status:    Tobacco Use    Smoking status: Never     Passive exposure: Past    Smokeless tobacco: Never    Tobacco comments:     Never   Vaping Use    Vaping status: Never Used   Substance and Sexual Activity    Alcohol use: Yes     Alcohol/week: 1.0 standard drink of alcohol     Types: 1 Glasses of wine per week     Comment: Very socail maybe once a month    Drug use: Never    Sexual activity: Yes     Partners: Male     Birth control/protection: Birth control pill, Partner of same sex      No Known Allergies   Current Outpatient Medications on File Prior to Visit   Medication Sig Dispense Refill    glucose blood (Glucose Meter Test) test strip Check blood sugars 3 times daily as needed. 100 each 5    glucose monitor monitoring kit Use 1 each As Needed (Check blood sugars 3 times daily as needed.). 1 each 0    Lancets misc Check blood sugars 3 times daily as needed. 100 each 5    Progesterone (PROMETRIUM) 100 MG capsule Take 2 capsules by mouth Daily.      [DISCONTINUED] empagliflozin (Jardiance) 25 MG tablet tablet Take 1 tablet by mouth Daily. 90 tablet 3    [DISCONTINUED] metFORMIN (GLUCOPHAGE) 1000 MG tablet TAKE 1 TABLET BY MOUTH TWICE  DAILY WITH A MEAL 180 tablet 3    [DISCONTINUED] Tirzepatide (MOUNJARO) 2.5 MG/0.5ML solution pen-injector  "pen Inject 0.5 mL under the skin into the appropriate area as directed 1 (One) Time Per Week. 2 mL 1     No current facility-administered medications on file prior to visit.        Review of Systems   Constitutional:  Negative for activity change, appetite change, unexpected weight gain and unexpected weight loss.   Eyes:  Negative for visual disturbance.   Respiratory:  Negative for shortness of breath.    Cardiovascular:  Negative for chest pain.   Gastrointestinal:  Negative for abdominal pain, constipation and diarrhea.   Endocrine: Negative for polydipsia and polyuria.   Skin:  Negative for rash and skin lesions.   Neurological:  Negative for dizziness and numbness.        /76   Pulse 89   Ht 162.6 cm (64.02\")   Wt 65.8 kg (145 lb)   SpO2 96%   BMI 24.88 kg/m²      Physical Exam  Constitutional:       Appearance: Normal appearance.   Cardiovascular:      Rate and Rhythm: Normal rate and regular rhythm.      Pulses:           Dorsalis pedis pulses are 2+ on the right side and 2+ on the left side.        Posterior tibial pulses are 2+ on the right side and 2+ on the left side.   Pulmonary:      Effort: Pulmonary effort is normal.   Musculoskeletal:         General: Normal range of motion.      Right foot: No deformity.      Left foot: No deformity.   Feet:      Right foot:      Protective Sensation: 5 sites tested.  5 sites sensed.      Skin integrity: Skin integrity normal. No ulcer.      Toenail Condition: Right toenails are normal.      Left foot:      Protective Sensation: 5 sites tested.  5 sites sensed.      Skin integrity: Skin integrity normal. No ulcer.      Toenail Condition: Left toenails are normal.      Comments: Diabetic Foot Exam Performed and Monofilament Test Performed     Skin:     General: Skin is warm and dry.   Neurological:      General: No focal deficit present.      Mental Status: She is alert and oriented to person, place, and time.   Psychiatric:         Mood and Affect: Mood " "normal.         Behavior: Behavior normal.         LABS AND IMAGING  CMP:  Lab Results   Component Value Date    Glucose 159 (A) 03/07/2025    Glucose 114 (H) 02/10/2023    BUN 15 11/25/2024    BUN 12 02/10/2023    BUN/Creatinine Ratio 20.8 11/25/2024    Creatinine 0.72 11/25/2024    Creatinine 0.75 02/10/2023    eGFR 102.6 11/25/2024    CO2 24.0 11/25/2024    Calcium 9.2 11/25/2024    Calcium 8.8 02/10/2023    Albumin 3.9 06/20/2024    AST (SGOT) 13 06/20/2024    ALT (SGPT) 21 06/20/2024     Lipid Panel:  Lab Results   Component Value Date    CHOL 179 06/20/2024    TRIG 98 06/20/2024    HDL 66 (H) 06/20/2024    VLDL 18 06/20/2024    LDL 95 06/20/2024     HbA1c:  Hemoglobin A1C   Date Value Ref Range Status   03/07/2025 7.4 (A) 4.5 - 5.7 % Final     Glucose:  Lab Results   Component Value Date    POCGLU 159 (A) 03/07/2025     Microalbumin:  No results found for: \"MALBCRERATIO\"  TSH:  Lab Results   Component Value Date    TSH 3.430 06/20/2024       Assessment and Plan    Diagnoses and all orders for this visit:    1. Type 2 diabetes mellitus with hyperglycemia, without long-term current use of insulin (Primary)  Assessment & Plan:  Diabetes is not controlled.  A1C is 7.4% today    Discussed increasing Mounjaro; defers at this time given recent dose change.    Encouraged to continue blood sugar monitoring.  Rx: Continue Mounjaro 5 mg weekly, Jardiance 25 mg daily, metformin 1000 mg 1 tablet twice daily.    Foot exam; performed today without sensation loss or ulceration.   Microalbumin:cr; ordered today  Eye exam; encouraged yearly  LDL at goal <100  BP at goal <130/80    Cautioned risk for hypoglycemia; advised glucose supplement as needed.    Discussed complications associated with diabetes.   Discussed BG goals  fasting, <180 2-hours postprandial.   Encouraged adherence with taking medications;    Encouraged continued BG monitoring.   Encouraged continued effort toward lifestyle management:         Increase " lean protein and vegetable intake  Avoid concentrated sugar drinks, such as sodas, and processed carbs including crackers, cookies, cakes  Routine physical activity.     Orders:  -     POC Glycosylated Hemoglobin (Hb A1C)  -     POC Glucose, Blood  -     IA-2 Autoantibodies  -     Glutamic Acid Decarboxylase  -     ZNT8 Antibodies  -     C-Peptide  -     Microalbumin / Creatinine Urine Ratio - Urine, Clean Catch    Other orders  -     metFORMIN (GLUCOPHAGE) 1000 MG tablet; Take 1 tablet by mouth 2 (Two) Times a Day With Meals.  Dispense: 180 tablet; Refill: 3  -     empagliflozin (Jardiance) 25 MG tablet tablet; Take 1 tablet by mouth Daily.  Dispense: 90 tablet; Refill: 3  -     Tirzepatide 5 MG/0.5ML solution auto-injector; Inject 5 mg under the skin into the appropriate area as directed 1 (One) Time Per Week.  Dispense: 2 mL; Refill: 5         Return in about 3 months (around 6/7/2025) for follow-up diabetes. The patient was instructed to contact the clinic with any interval questions or concerns.    Electronically signed by: Jennifer Irizarry PA-C   Endocrinology    Please note that portions of this note were completed with a voice recognition program.

## 2025-03-08 LAB
ALBUMIN UR-MCNC: <1.2 MG/DL
CREAT UR-MCNC: 21.6 MG/DL
MICROALBUMIN/CREAT UR: NORMAL MG/G{CREAT}

## 2025-03-09 LAB — C PEPTIDE SERPL-MCNC: 2.8 NG/ML (ref 1.1–4.4)

## 2025-03-12 LAB — GAD65 AB SER IA-ACNC: <5 U/ML (ref 0–5)

## 2025-03-16 LAB — ZNT8 AB SERPL IA-ACNC: <15 U/ML

## 2025-03-20 LAB — ISLET CELL512 AB SER-ACNC: <7.5 U/ML

## 2025-03-27 ENCOUNTER — OFFICE VISIT (OUTPATIENT)
Dept: INTERNAL MEDICINE | Facility: CLINIC | Age: 50
End: 2025-03-27
Payer: COMMERCIAL

## 2025-03-27 VITALS
BODY MASS INDEX: 24.92 KG/M2 | HEIGHT: 64 IN | DIASTOLIC BLOOD PRESSURE: 70 MMHG | OXYGEN SATURATION: 99 % | WEIGHT: 146 LBS | SYSTOLIC BLOOD PRESSURE: 106 MMHG | TEMPERATURE: 96.8 F | HEART RATE: 72 BPM

## 2025-03-27 DIAGNOSIS — D50.8 OTHER IRON DEFICIENCY ANEMIA: ICD-10-CM

## 2025-03-27 DIAGNOSIS — E55.9 VITAMIN D DEFICIENCY: ICD-10-CM

## 2025-03-27 DIAGNOSIS — E11.9 TYPE 2 DIABETES MELLITUS WITHOUT COMPLICATION, WITHOUT LONG-TERM CURRENT USE OF INSULIN: ICD-10-CM

## 2025-03-27 DIAGNOSIS — Z00.00 ANNUAL PHYSICAL EXAM: Primary | ICD-10-CM

## 2025-03-27 RX ORDER — ONDANSETRON 4 MG/1
4 TABLET, ORALLY DISINTEGRATING ORAL EVERY 8 HOURS PRN
Qty: 30 TABLET | Refills: 3 | Status: SHIPPED | OUTPATIENT
Start: 2025-03-27

## 2025-03-27 RX ORDER — GLIPIZIDE 10 MG/1
TABLET, FILM COATED, EXTENDED RELEASE ORAL
COMMUNITY
Start: 2025-03-21 | End: 2025-03-27

## 2025-03-27 NOTE — PROGRESS NOTES
Answers submitted by the patient for this visit:  Diabetes Questionnaire (Submitted on 3/26/2025)  Chief Complaint: Diabetes problem  Diabetes type: type 2  MedicAlert ID: No  Disease duration: 2018 Years  Treatment compliance: all of the time  Symptom course: improving  Home blood tests: 1-2 x per day  High score: 140-180  Below 70: occasionally  blurred vision: No  foot paresthesias: No  foot ulcerations: No  polydipsia: No  polyuria: No  weight loss: Yes  blackouts: No  hospitalization: No  nocturnal hypoglycemia: No  required assistance: No  required glucagon: No  confusion: No  headaches: No  speech difficulty: No  sweats: Yes  tremors: Yes  Current diet: generally healthy, low fat/cholesterol, low salt  Meal planning: carbohydrate counting  Exercise: five times a week  Eye exam current: Yes  Sees podiatrist: No       Female Physical Note      Date: 2025   Patient Name: Sada Ugalde  : 1975   MRN: [id number]     Chief Complaint:    Chief Complaint   Patient presents with    Annual Exam    Diabetes       History of Present Illness: Sada Ugalde is a 49 y.o. female who is here today for their annual health maintenance and physical. F/u on dm2,       Subjective      Review of Systems     I have reviewed the following portions of the patient's history and these were updated and discussed with the patient as appropriate: past family history, past medical history, past social history, past surgical history, and problem list.     Medications:     Current Outpatient Medications:     empagliflozin (Jardiance) 25 MG tablet tablet, Take 1 tablet by mouth Daily., Disp: 90 tablet, Rfl: 3    glucose blood (Glucose Meter Test) test strip, Check blood sugars 3 times daily as needed., Disp: 100 each, Rfl: 5    glucose monitor monitoring kit, Use 1 each As Needed (Check blood sugars 3 times daily as needed.)., Disp: 1 each, Rfl: 0    Lancets misc, Check blood sugars 3 times daily as needed., Disp: 100 each,  Rfl: 5    metFORMIN (GLUCOPHAGE) 1000 MG tablet, Take 1 tablet by mouth 2 (Two) Times a Day With Meals., Disp: 180 tablet, Rfl: 3    Progesterone (PROMETRIUM) 100 MG capsule, Take 2 capsules by mouth Daily., Disp: , Rfl:     Tirzepatide 5 MG/0.5ML solution auto-injector, Inject 5 mg under the skin into the appropriate area as directed 1 (One) Time Per Week., Disp: 2 mL, Rfl: 5    Allergies:   No Known Allergies    Immunizations:  Immunization History   Administered Date(s) Administered    COVID-19 (PFIZER) BIVALENT 12+YRS 12/08/2022    COVID-19 (PFIZER) Purple Cap Monovalent 03/19/2022    Flublock Quad =>18yrs 10/02/2023    Hepatitis B Adult/Adolescent IM 02/03/2014, 03/04/2014, 07/30/2014    Tdap 01/01/2020      Colorectal Screening:     Last Completed Colonoscopy            Needs Review       COLORECTAL CANCER SCREENING (COLONOSCOPY - Every 10 Years) Tentatively due on 2/3/2033      02/03/2023  Outside Procedure: COLONOSCOPY    01/02/2023  COLONOSCOPY (Done)                         UTD  Pap:    Last Completed Pap Smear            Upcoming       PAP SMEAR (Every 3 Years) Next due on 6/23/2026 06/23/2023  Done - est date                         UTD  Mammogram:    Last Completed Mammogram            Upcoming       MAMMOGRAM (Every 2 Years) Next due on 2/19/2027 02/19/2025  Mammo Screening Digital Tomosynthesis Bilateral With CAD    12/06/2023  MAMMO SCREENING DIGITAL TOMOSYNTHESIS BILATERAL W CAD    11/06/2023  SCANNED - MAMMO    10/17/2022  MAMMO SCREENING DIGITAL TOMOSYNTHESIS BILATERAL W CAD    09/20/2021  MAMMO SCREENING DIGITAL TOMOSYNTHESIS BILATERAL W CAD     Only the first 5 history entries have been loaded, but more history exists.                               Diet/Physical activity: avg    Sexual Health:   Last menstrual period: na     Breast feeding: Breastfeeding Status:  na      PHQ-9 Depression Screening  PHQ-9 Total Score:   0           Intimate partner violence: (Screen on initial visit,  "pregnant women, women with injuries, older adult with injury or evidence of neglect):  Violence can be a problem in many people's lives, so I now ask every patient about trauma or abuse they may have experienced in a relationship.  Stress/Safety - Do you feel safe in your relationship? Yes  Afraid/Abused - Have you ever been in a relationship where you were threatened, hurt, or afraid? No  Friend/Family - If yes, are your friends aware you have been hurt?  Emergency Plan - Do you have a safe place to go and the resources you need in an emergency? Yes    Osteoporosis:  NA      Objective     Physical Exam:  Vital Signs:   Vitals:    03/27/25 1350   BP: 106/70   BP Location: Left arm   Patient Position: Sitting   Cuff Size: Adult   Pulse: 72   Temp: 96.8 °F (36 °C)   TempSrc: Tympanic   SpO2: 99%   Weight: 66.2 kg (146 lb)   Height: 162.6 cm (64\")       Physical Exam  Constitutional:       General: She is not in acute distress.     Appearance: Normal appearance. She is not ill-appearing.   HENT:      Right Ear: Tympanic membrane normal.      Left Ear: Tympanic membrane normal.      Nose: No congestion or rhinorrhea.      Mouth/Throat:      Mouth: Mucous membranes are moist.      Pharynx: No oropharyngeal exudate.   Eyes:      Extraocular Movements: Extraocular movements intact.      Conjunctiva/sclera: Conjunctivae normal.      Pupils: Pupils are equal, round, and reactive to light.   Cardiovascular:      Rate and Rhythm: Normal rate and regular rhythm.   Pulmonary:      Effort: Pulmonary effort is normal. No respiratory distress.      Breath sounds: Normal breath sounds.   Abdominal:      General: Abdomen is flat. Bowel sounds are normal.      Palpations: Abdomen is soft.      Tenderness: There is no abdominal tenderness.   Musculoskeletal:         General: No swelling.      Cervical back: Neck supple.      Right lower leg: No edema.      Left lower leg: No edema.   Skin:     Coloration: Skin is not jaundiced.      " Findings: No rash.   Neurological:      General: No focal deficit present.      Mental Status: She is alert and oriented to person, place, and time.      Cranial Nerves: No cranial nerve deficit.   Psychiatric:         Mood and Affect: Mood normal.         Behavior: Behavior normal.         Thought Content: Thought content normal.         Procedures    Assessment / Plan      Assessment/Plan:   Diagnoses and all orders for this visit:    1. Annual physical exam (Primary)  -     Hemoglobin A1c  -     Vitamin B12  -     Lipid Panel  -     Comprehensive Metabolic Panel  -     CBC & Differential  -     Urinalysis With Microscopic - Urine, Clean Catch  -     TSH Rfx On Abnormal To Free T4    2. Vitamin D deficiency  -     Vitamin D,25-Hydroxy    3. Type 2 diabetes mellitus without complication, without long-term current use of insulin  -     Cancel: Microalbumin / Creatinine Urine Ratio - Urine, Clean Catch; Future  -     Microalbumin / Creatinine Urine Ratio - Urine, Clean Catch  -     High Sensitivity CRP; Future  -     CT Cardiac Calcium Score Without Dye; Future    4. Other iron deficiency anemia  -     Iron and TIBC       Discussed statin and benefits. She will get cardiac calcium ct. Declines for now.     BMI is within normal parameters. No other follow-up for BMI required.       Follow Up:   No follow-ups on file.    Healthcare Maintenance:   Counseling provided on exercise, nutrition, age-appropriate screening test, and appropriate lab studies.   Sadasumanth Bonillakaykay voices understanding and acceptance of this advice and will call back with any further questions or concerns. AVS with preventive healthcare tips printed for patient.     Reviewed the following with the patient: [employer] educational material given to patient.          Kevin Ang DO  [facility]STELL[facility]O

## 2025-04-01 ENCOUNTER — TELEPHONE (OUTPATIENT)
Dept: CARDIOLOGY | Facility: CLINIC | Age: 50
End: 2025-04-01
Payer: COMMERCIAL

## 2025-04-02 ENCOUNTER — TELEPHONE (OUTPATIENT)
Dept: CARDIOLOGY | Facility: CLINIC | Age: 50
End: 2025-04-02

## 2025-04-16 ENCOUNTER — OFFICE VISIT (OUTPATIENT)
Dept: CARDIOLOGY | Facility: CLINIC | Age: 50
End: 2025-04-16
Payer: COMMERCIAL

## 2025-04-16 VITALS
SYSTOLIC BLOOD PRESSURE: 112 MMHG | WEIGHT: 144 LBS | HEIGHT: 63 IN | DIASTOLIC BLOOD PRESSURE: 60 MMHG | OXYGEN SATURATION: 97 % | HEART RATE: 60 BPM | BODY MASS INDEX: 25.52 KG/M2

## 2025-04-16 DIAGNOSIS — Z91.89 CARDIOVASCULAR RISK FACTOR: Primary | ICD-10-CM

## 2025-04-16 DIAGNOSIS — Z82.49 FAMILY HISTORY OF EARLY CAD: ICD-10-CM

## 2025-04-16 RX ORDER — FERROUS GLUCONATE 324(38)MG
324 TABLET ORAL
COMMUNITY

## 2025-04-16 RX ORDER — PHENOL 1.4 %
600 AEROSOL, SPRAY (ML) MUCOUS MEMBRANE DAILY
COMMUNITY

## 2025-04-16 RX ORDER — MULTIVIT WITH MINERALS/LUTEIN
250 TABLET ORAL DAILY
COMMUNITY

## 2025-04-16 NOTE — PROGRESS NOTES
"[facility] Cardiology  Consultation H&P  Sada Ugalde  1975  [phone]  There is no work phone number on file..    VISIT DATE:  01/30/2024    PCP: Kevin Ang DO  [id number] [place], [place] [address]    CC:  Chief Complaint   Patient presents with    ype 2 diabetes mellitus without complication, without long-         ASSESSMENT:  No diagnosis found.        PLAN:  Type 2 diabetes mellitus: Currently well-controlled.  Agree with current medical therapy.  Afterload well-controlled.  Continue heart healthy diet and regular exercise.  Fasting lipid panel pending, goal LDL less than 100, goal HDL greater than 50, goal triglyceride less than 150.    History of Present Illness   48-year-old female with type 2 diabetes since 2008 and family history of early coronary disease in her father and paternal aunt.  Reports that most recent hemoglobin A1c 6.2.  Blood pressures running less than 130/80 mmHg.  Previous lipid profile not available for review, repeat labs pending with primary care physician in April.  Recently moved to [place] from [place].  Compliant with medical therapy.  , is able to exercise without any limitations.  Denies chest pain, palpitations or dyspnea on exertion.    PHYSICAL EXAMINATION:  Vitals:    04/16/25 1036   BP: 112/60   Pulse: 60   SpO2: 97%   Weight: 65.3 kg (144 lb)   Height: 160 cm (63\")     General Appearance:    Alert, cooperative, no distress, appears stated age   Head:    Normocephalic, without obvious abnormality, atraumatic   Eyes:    conjunctiva/corneas clear, EOM's intact, fundi     benign, both eyes   Ears:    Normal TM's and external ear canals, both ears   Nose:   Nares normal, septum midline, mucosa normal, no drainage    or sinus tenderness   Throat:   Lips, mucosa, and tongue normal; teeth and gums normal   Neck:   Supple, symmetrical, trachea midline, no adenopathy;     thyroid:  no " enlargement/tenderness/nodules; no carotid    bruit or JVD   Back:     Symmetric, no curvature, ROM normal, no CVA tenderness   Lungs:     Clear to auscultation bilaterally, respirations unlabored   Chest Wall:    No tenderness or deformity    Heart:    Regular rate and rhythm, S1 and S2 normal, no murmur, rub   or gallop, normal carotid impulse bilaterally without bruit.   Abdomen:     Soft, non-tender, bowel sounds active all four quadrants,     no masses, no organomegaly   Extremities:   Extremities normal, atraumatic, no cyanosis or edema   Pulses:   2+ and symmetric all extremities   Skin:   Skin color, texture, turgor normal, no rashes or lesions   Lymph nodes:   Cervical, supraclavicular, and axillary nodes normal   Neurologic:   normal strength, sensation intact     throughout       Diagnostic Data:  Procedures  Lab Results   Component Value Date    TRIG 98 06/20/2024    HDL 66 (H) 06/20/2024     Lab Results   Component Value Date    GLUCOSE 190 (H) 11/25/2024    BUN 15 11/25/2024    CREATININE 0.72 11/25/2024     11/25/2024    K 4.1 11/25/2024     11/25/2024    CO2 24.0 11/25/2024     Lab Results   Component Value Date    HGBA1C 7.4 (A) 03/07/2025     Lab Results   Component Value Date    WBC 7.02 06/20/2024    HGB 14.3 06/20/2024    HCT 43.8 06/20/2024     06/20/2024       PROBLEM LIST:  Patient Active Problem List   Diagnosis    Type 2 diabetes mellitus with hyperglycemia, without long-term current use of insulin       PAST MEDICAL HX  Past Medical History:   Diagnosis Date    Hypoglycemia     Sometimes    Knee sprain 2022    Lumbosacral disc disease Back sx done    Testosterone deficiency 2014    Applying tropical cream with estradiol    Type 2 diabetes mellitus     Vitamin D deficiency     Taking supplements       Allergies  No Known Allergies    Current Medications    Current Outpatient Medications:     calcium carbonate (OS-BANG) 600 MG tablet, Take 1 tablet by mouth Daily., Disp: ,  Rfl:     empagliflozin (Jardiance) 25 MG tablet tablet, Take 1 tablet by mouth Daily., Disp: 90 tablet, Rfl: 3    ferrous gluconate (FERGON) 324 MG tablet, Take 1 tablet by mouth Daily With Breakfast., Disp: , Rfl:     glucose blood (Glucose Meter Test) test strip, Check blood sugars 3 times daily as needed., Disp: 100 each, Rfl: 5    glucose monitor monitoring kit, Use 1 each As Needed (Check blood sugars 3 times daily as needed.)., Disp: 1 each, Rfl: 0    Lancets misc, Check blood sugars 3 times daily as needed., Disp: 100 each, Rfl: 5    magnesium chloride ER 64 MG DR tablet, Take  by mouth Daily., Disp: , Rfl:     metFORMIN (GLUCOPHAGE) 1000 MG tablet, Take 1 tablet by mouth 2 (Two) Times a Day With Meals., Disp: 180 tablet, Rfl: 3    Omega 3 340 MG capsule delayed-release, Take  by mouth., Disp: , Rfl:     ondansetron ODT (ZOFRAN-ODT) 4 MG disintegrating tablet, Place 1 tablet on the tongue Every 8 (Eight) Hours As Needed for Nausea or Vomiting., Disp: 30 tablet, Rfl: 3    Tirzepatide 5 MG/0.5ML solution auto-injector, Inject 5 mg under the skin into the appropriate area as directed 1 (One) Time Per Week., Disp: 2 mL, Rfl: 5    vitamin C (ASCORBIC ACID) 250 MG tablet, Take 1 tablet by mouth Daily., Disp: , Rfl:     Vitamin D-Vitamin K (VITAMIN D2 + K1 PO), Take  by mouth., Disp: , Rfl:     Progesterone (PROMETRIUM) 100 MG capsule, Take 2 capsules by mouth Daily. (Patient not taking: Reported on 4/16/2025), Disp: , Rfl:          ROS  ROS      SOCIAL HX  Social History     Socioeconomic History    Marital status:    Tobacco Use    Smoking status: Never     Passive exposure: Past    Smokeless tobacco: Never    Tobacco comments:     Never   Vaping Use    Vaping status: Never Used   Substance and Sexual Activity    Alcohol use: Yes     Alcohol/week: 1.0 standard drink of alcohol     Types: 1 Glasses of wine per week     Comment: Very socail maybe once in 3-4 months.    Drug use: Never    Sexual activity: Yes      Partners: Male     Birth control/protection: None     Comment: Menopausal       FAMILY HX  Family History   Problem Relation Age of Onset    Cervical cancer Mother     Hypertension Mother         Had cervical cancer    Cancer Mother         She  of cervical cancer.    Heart attack Father         Dad had a stroke ans then heart attack.    Diabetes Father     Stroke Father     Diabetes Brother     Heart attack Brother         Had high blood pressure/ Diabetes    Breast cancer Neg Hx              Jose Vargas III, MD, FACC

## 2025-04-16 NOTE — PROGRESS NOTES
Cardiac Electrophysiology Outpatient Follow Up Note            [facility]    Follow Up Office Visit      Sada Ugalde  [id number]  2025  [unfilled]  [unfilled]    Primary Care Physician: Kevin Ang DO    Referred By: No ref. provider found    Subjective     Chief Complaint:   Diagnoses and all orders for this visit:    1. Cardiovascular risk factor (Primary)    2. Family history of early CAD      Chief Complaint   Patient presents with    ype 2 diabetes mellitus without complication, without long-       History of Present Illness:   Sada Ugalde is a 50 y.o. female who presents to clinic for follow up of cardiovascular risk factors.  She was last seen by Dr. Vargas 2024.  She has longstanding history of diabetes since  and family history of early onset coronary disease including her father and she has an aunt.  She has worked hard to keep your A1c controlled.  She is on good regiment for her diabetes.  She is a quite active person she does [employer] she exercises regularly she also travels frequently.  She tells me she is not having chest pain chest tightness dizziness near syncope or syncope.  Her cholesterol panel remotely has been good she is going to have a follow-up lipid panel as well as her provider has recommended she have a coronary calcium score test which she is going to do the next few weeks.  Overall she feels doing well since she last seen by Dr. Vargas.  .      Past Medical History:   Past Medical History:   Diagnosis Date    Hypoglycemia     Sometimes    Knee sprain     Lumbosacral disc disease Back sx done    Testosterone deficiency 2014    Applying tropical cream with estradiol    Type 2 diabetes mellitus     Vitamin D deficiency     Taking supplements       Past Surgical History:   Past Surgical History:   Procedure Laterality Date    BACK SURGERY       SECTION      COLONOSCOPY      1very small  polyup       Family History:   Family History   Problem Relation Age of Onset    Cervical cancer Mother     Hypertension Mother         Had cervical cancer    Cancer Mother         She  of cervical cancer.    Heart attack Father         Dad had a stroke ans then heart attack.    Diabetes Father     Stroke Father     Diabetes Brother     Heart attack Brother         Had high blood pressure/ Diabetes    Breast cancer Neg Hx        Social History:   Social History     Socioeconomic History    Marital status:    Tobacco Use    Smoking status: Never     Passive exposure: Past    Smokeless tobacco: Never    Tobacco comments:     Never   Vaping Use    Vaping status: Never Used   Substance and Sexual Activity    Alcohol use: Yes     Alcohol/week: 1.0 standard drink of alcohol     Types: 1 Glasses of wine per week     Comment: Very socail maybe once in 3-4 months.    Drug use: Never    Sexual activity: Yes     Partners: Male     Birth control/protection: None     Comment: Menopausal       Medications:     Current Outpatient Medications:     calcium carbonate (OS-BANG) 600 MG tablet, Take 1 tablet by mouth Daily., Disp: , Rfl:     empagliflozin (Jardiance) 25 MG tablet tablet, Take 1 tablet by mouth Daily., Disp: 90 tablet, Rfl: 3    ferrous gluconate (FERGON) 324 MG tablet, Take 1 tablet by mouth Daily With Breakfast., Disp: , Rfl:     glucose blood (Glucose Meter Test) test strip, Check blood sugars 3 times daily as needed., Disp: 100 each, Rfl: 5    glucose monitor monitoring kit, Use 1 each As Needed (Check blood sugars 3 times daily as needed.)., Disp: 1 each, Rfl: 0    Lancets misc, Check blood sugars 3 times daily as needed., Disp: 100 each, Rfl: 5    magnesium chloride ER 64 MG DR tablet, Take  by mouth Daily., Disp: , Rfl:     metFORMIN (GLUCOPHAGE) 1000 MG tablet, Take 1 tablet by mouth 2 (Two) Times a Day With Meals., Disp: 180 tablet, Rfl: 3    Omega 3 340 MG capsule delayed-release, Take  by mouth.,  "Disp: , Rfl:     ondansetron ODT (ZOFRAN-ODT) 4 MG disintegrating tablet, Place 1 tablet on the tongue Every 8 (Eight) Hours As Needed for Nausea or Vomiting., Disp: 30 tablet, Rfl: 3    Tirzepatide 5 MG/0.5ML solution auto-injector, Inject 5 mg under the skin into the appropriate area as directed 1 (One) Time Per Week., Disp: 2 mL, Rfl: 5    vitamin C (ASCORBIC ACID) 250 MG tablet, Take 1 tablet by mouth Daily., Disp: , Rfl:     Vitamin D-Vitamin K (VITAMIN D2 + K1 PO), Take  by mouth., Disp: , Rfl:     Progesterone (PROMETRIUM) 100 MG capsule, Take 2 capsules by mouth Daily. (Patient not taking: Reported on 4/16/2025), Disp: , Rfl:     Allergies:   No Known Allergies    Objective   Vital Signs:   Vitals:    04/16/25 1036   BP: 112/60   Pulse: 60   SpO2: 97%   Weight: 65.3 kg (144 lb)   Height: 160 cm (63\")       PHYSICAL EXAM  General appearance: Awake, alert, cooperative  Head: Normocephalic, without obvious abnormality, atraumatic  Eyes: Conjunctivae/corneas clear, EOMs intact  Neck: no adenopathy, no carotid bruit, no JVD, and thyroid: not enlarged  Lungs: clear to auscultation bilaterally and no rhonchi or crackles\", ' symmetric  Heart: regular rate and rhythm, S1, S2 normal, no murmur, click, rub or gallop  Abdomen: Soft, non-tender, bowel sounds normal,  no organomegaly  Extremities: extremities normal, atraumatic, no cyanosis or edema  Skin: Skin color, turgor normal, no rashes or lesions  Neurologic: Grossly normal     Lab Results   Component Value Date    GLUCOSE 190 (H) 11/25/2024    CALCIUM 9.2 11/25/2024     11/25/2024    K 4.1 11/25/2024    CO2 24.0 11/25/2024     11/25/2024    BUN 15 11/25/2024    CREATININE 0.72 11/25/2024    BCR 20.8 11/25/2024    ANIONGAP 11.0 11/25/2024     Lab Results   Component Value Date    WBC 7.02 06/20/2024    HGB 14.3 06/20/2024    HCT 43.8 06/20/2024    MCV 89.0 06/20/2024     06/20/2024     No results found for: \"INR\", \"PROTIME\"  Lab Results "   Component Value Date    TSH 3.430 06/20/2024       Cardiac Testing:     I personally viewed and interpreted the patient's EKG/Telemetry/lab data    Procedures  EKG NSR.   Assessment & Plan    Diagnoses and all orders for this visit:    1. Cardiovascular risk factor (Primary)    2. Family history of early CAD         Diagnosis Plan   1. Cardiovascular risk factor  Diabetes mellitus type 2, agree with PCP would recommend coronary calcium score test determine need for statin therapy.      2. Family history of early CAD  Continue focus on risk factors diet exercise weight loss maintain good A1c.      She has no anginal heart failure symptoms.  She exercises regular basis.  She is working hard on diet and exercise she is also working to come keep her A1c controlled.  She is going to have a calcium coronary test.  We discussed options if this is positive to pursue statin therapy she declines for now but she may consider this on further discussion.  She return for prevention 1 year or sooner if she has any change or symptoms.  Electronically signed by ADELE Spann, 04/16/25, 12:53 PM EDT.

## 2025-04-24 ENCOUNTER — LAB (OUTPATIENT)
Dept: INTERNAL MEDICINE | Facility: CLINIC | Age: 50
End: 2025-04-24
Payer: COMMERCIAL

## 2025-04-24 DIAGNOSIS — E11.9 TYPE 2 DIABETES MELLITUS WITHOUT COMPLICATION, WITHOUT LONG-TERM CURRENT USE OF INSULIN: ICD-10-CM

## 2025-04-24 LAB
25(OH)D3 SERPL-MCNC: 24.5 NG/ML (ref 30–100)
ALBUMIN SERPL-MCNC: 3.9 G/DL (ref 3.5–5.2)
ALBUMIN UR-MCNC: <1.2 MG/DL
ALBUMIN/GLOB SERPL: 1.4 G/DL
ALP SERPL-CCNC: 75 U/L (ref 39–117)
ALT SERPL W P-5'-P-CCNC: 14 U/L (ref 1–33)
ANION GAP SERPL CALCULATED.3IONS-SCNC: 12.2 MMOL/L (ref 5–15)
AST SERPL-CCNC: 17 U/L (ref 1–32)
BACTERIA UR QL AUTO: NORMAL /HPF
BASOPHILS # BLD AUTO: 0.01 10*3/MM3 (ref 0–0.2)
BASOPHILS NFR BLD AUTO: 0.2 % (ref 0–1.5)
BILIRUB SERPL-MCNC: 0.3 MG/DL (ref 0–1.2)
BILIRUB UR QL STRIP: NEGATIVE
BUN SERPL-MCNC: 13 MG/DL (ref 6–20)
BUN/CREAT SERPL: 16 (ref 7–25)
CALCIUM SPEC-SCNC: 9.1 MG/DL (ref 8.6–10.5)
CHLORIDE SERPL-SCNC: 106 MMOL/L (ref 98–107)
CHOLEST SERPL-MCNC: 193 MG/DL (ref 0–200)
CLARITY UR: CLEAR
CO2 SERPL-SCNC: 22.8 MMOL/L (ref 22–29)
COLOR UR: YELLOW
CREAT SERPL-MCNC: 0.81 MG/DL (ref 0.57–1)
CREAT UR-MCNC: 47.6 MG/DL
CRP SERPL-MCNC: 0.55 MG/DL (ref 0.01–0.5)
DEPRECATED RDW RBC AUTO: 38.6 FL (ref 37–54)
EGFRCR SERPLBLD CKD-EPI 2021: 88.6 ML/MIN/1.73
EOSINOPHIL # BLD AUTO: 0.26 10*3/MM3 (ref 0–0.4)
EOSINOPHIL NFR BLD AUTO: 3.9 % (ref 0.3–6.2)
ERYTHROCYTE [DISTWIDTH] IN BLOOD BY AUTOMATED COUNT: 12.4 % (ref 12.3–15.4)
GLOBULIN UR ELPH-MCNC: 2.8 GM/DL
GLUCOSE SERPL-MCNC: 129 MG/DL (ref 65–99)
GLUCOSE UR STRIP-MCNC: ABNORMAL MG/DL
HBA1C MFR BLD: 7.3 % (ref 4.8–5.6)
HCT VFR BLD AUTO: 43.6 % (ref 34–46.6)
HDLC SERPL-MCNC: 80 MG/DL (ref 40–60)
HGB BLD-MCNC: 14.3 G/DL (ref 12–15.9)
HGB UR QL STRIP.AUTO: NEGATIVE
HYALINE CASTS UR QL AUTO: NORMAL /LPF
IMM GRANULOCYTES # BLD AUTO: 0.02 10*3/MM3 (ref 0–0.05)
IMM GRANULOCYTES NFR BLD AUTO: 0.3 % (ref 0–0.5)
IRON 24H UR-MRATE: 46 MCG/DL (ref 37–145)
IRON SATN MFR SERPL: 14 % (ref 20–50)
KETONES UR QL STRIP: NEGATIVE
LDLC SERPL CALC-MCNC: 100 MG/DL (ref 0–100)
LDLC/HDLC SERPL: 1.23 {RATIO}
LEUKOCYTE ESTERASE UR QL STRIP.AUTO: NEGATIVE
LYMPHOCYTES # BLD AUTO: 2.3 10*3/MM3 (ref 0.7–3.1)
LYMPHOCYTES NFR BLD AUTO: 34.9 % (ref 19.6–45.3)
MCH RBC QN AUTO: 28.1 PG (ref 26.6–33)
MCHC RBC AUTO-ENTMCNC: 32.8 G/DL (ref 31.5–35.7)
MCV RBC AUTO: 85.7 FL (ref 79–97)
MICROALBUMIN/CREAT UR: NORMAL MG/G{CREAT}
MONOCYTES # BLD AUTO: 0.53 10*3/MM3 (ref 0.1–0.9)
MONOCYTES NFR BLD AUTO: 8 % (ref 5–12)
NEUTROPHILS NFR BLD AUTO: 3.47 10*3/MM3 (ref 1.7–7)
NEUTROPHILS NFR BLD AUTO: 52.7 % (ref 42.7–76)
NITRITE UR QL STRIP: NEGATIVE
NRBC BLD AUTO-RTO: 0 /100 WBC (ref 0–0.2)
PH UR STRIP.AUTO: 7 [PH] (ref 5–8)
PLATELET # BLD AUTO: 294 10*3/MM3 (ref 140–450)
PMV BLD AUTO: 10.8 FL (ref 6–12)
POTASSIUM SERPL-SCNC: 4.3 MMOL/L (ref 3.5–5.2)
PROT SERPL-MCNC: 6.7 G/DL (ref 6–8.5)
PROT UR QL STRIP: NEGATIVE
RBC # BLD AUTO: 5.09 10*6/MM3 (ref 3.77–5.28)
RBC # UR STRIP: NORMAL /HPF
REF LAB TEST METHOD: NORMAL
SODIUM SERPL-SCNC: 141 MMOL/L (ref 136–145)
SP GR UR STRIP: 1.03 (ref 1–1.03)
SQUAMOUS #/AREA URNS HPF: NORMAL /HPF
T4 FREE SERPL-MCNC: 1.5 NG/DL (ref 0.92–1.68)
TIBC SERPL-MCNC: 326 MCG/DL (ref 298–536)
TRANSFERRIN SERPL-MCNC: 219 MG/DL (ref 200–360)
TRIGL SERPL-MCNC: 72 MG/DL (ref 0–150)
TSH SERPL DL<=0.05 MIU/L-ACNC: 5 UIU/ML (ref 0.27–4.2)
UROBILINOGEN UR QL STRIP: ABNORMAL
VIT B12 BLD-MCNC: 1070 PG/ML (ref 211–946)
VLDLC SERPL-MCNC: 13 MG/DL (ref 5–40)
WBC # UR STRIP: NORMAL /HPF
WBC NRBC COR # BLD AUTO: 6.59 10*3/MM3 (ref 3.4–10.8)

## 2025-04-24 PROCEDURE — 80061 LIPID PANEL: CPT | Performed by: INTERNAL MEDICINE

## 2025-04-24 PROCEDURE — 82607 VITAMIN B-12: CPT | Performed by: INTERNAL MEDICINE

## 2025-04-24 PROCEDURE — 85025 COMPLETE CBC W/AUTO DIFF WBC: CPT | Performed by: INTERNAL MEDICINE

## 2025-04-24 PROCEDURE — 83036 HEMOGLOBIN GLYCOSYLATED A1C: CPT | Performed by: INTERNAL MEDICINE

## 2025-04-24 PROCEDURE — 82043 UR ALBUMIN QUANTITATIVE: CPT | Performed by: INTERNAL MEDICINE

## 2025-04-24 PROCEDURE — 86141 C-REACTIVE PROTEIN HS: CPT | Performed by: INTERNAL MEDICINE

## 2025-04-24 PROCEDURE — 80053 COMPREHEN METABOLIC PANEL: CPT | Performed by: INTERNAL MEDICINE

## 2025-04-24 PROCEDURE — 84443 ASSAY THYROID STIM HORMONE: CPT | Performed by: INTERNAL MEDICINE

## 2025-04-24 PROCEDURE — 83540 ASSAY OF IRON: CPT | Performed by: INTERNAL MEDICINE

## 2025-04-24 PROCEDURE — 84439 ASSAY OF FREE THYROXINE: CPT | Performed by: INTERNAL MEDICINE

## 2025-04-24 PROCEDURE — 84466 ASSAY OF TRANSFERRIN: CPT | Performed by: INTERNAL MEDICINE

## 2025-04-24 PROCEDURE — 82306 VITAMIN D 25 HYDROXY: CPT | Performed by: INTERNAL MEDICINE

## 2025-04-24 PROCEDURE — 81001 URINALYSIS AUTO W/SCOPE: CPT | Performed by: INTERNAL MEDICINE

## 2025-04-24 PROCEDURE — 82570 ASSAY OF URINE CREATININE: CPT | Performed by: INTERNAL MEDICINE

## 2025-06-02 RX ORDER — GLIPIZIDE 10 MG/1
10 TABLET, FILM COATED, EXTENDED RELEASE ORAL DAILY
Qty: 90 TABLET | Refills: 3 | OUTPATIENT
Start: 2025-06-02

## 2025-06-23 ENCOUNTER — OFFICE VISIT (OUTPATIENT)
Dept: ENDOCRINOLOGY | Facility: CLINIC | Age: 50
End: 2025-06-23
Payer: COMMERCIAL

## 2025-06-23 VITALS
BODY MASS INDEX: 25.34 KG/M2 | SYSTOLIC BLOOD PRESSURE: 110 MMHG | HEIGHT: 63 IN | DIASTOLIC BLOOD PRESSURE: 68 MMHG | HEART RATE: 70 BPM | WEIGHT: 143 LBS

## 2025-06-23 DIAGNOSIS — E03.8 SUBCLINICAL HYPOTHYROIDISM: ICD-10-CM

## 2025-06-23 DIAGNOSIS — E11.9 TYPE 2 DIABETES MELLITUS WITHOUT COMPLICATION, WITHOUT LONG-TERM CURRENT USE OF INSULIN: Primary | ICD-10-CM

## 2025-06-23 LAB
EXPIRATION DATE: ABNORMAL
EXPIRATION DATE: ABNORMAL
GLUCOSE BLDC GLUCOMTR-MCNC: 137 MG/DL (ref 70–130)
HBA1C MFR BLD: 6.8 % (ref 4.5–5.7)
Lab: ABNORMAL
Lab: ABNORMAL
T4 FREE SERPL-MCNC: 1.56 NG/DL (ref 0.92–1.68)
TSH SERPL DL<=0.05 MIU/L-ACNC: 3.32 UIU/ML (ref 0.27–4.2)

## 2025-06-23 PROCEDURE — 84443 ASSAY THYROID STIM HORMONE: CPT | Performed by: PHYSICIAN ASSISTANT

## 2025-06-23 PROCEDURE — 99214 OFFICE O/P EST MOD 30 MIN: CPT | Performed by: PHYSICIAN ASSISTANT

## 2025-06-23 PROCEDURE — 83036 HEMOGLOBIN GLYCOSYLATED A1C: CPT | Performed by: PHYSICIAN ASSISTANT

## 2025-06-23 PROCEDURE — 82947 ASSAY GLUCOSE BLOOD QUANT: CPT | Performed by: PHYSICIAN ASSISTANT

## 2025-06-23 PROCEDURE — 86376 MICROSOMAL ANTIBODY EACH: CPT | Performed by: PHYSICIAN ASSISTANT

## 2025-06-23 PROCEDURE — 36415 COLL VENOUS BLD VENIPUNCTURE: CPT | Performed by: PHYSICIAN ASSISTANT

## 2025-06-23 PROCEDURE — 84439 ASSAY OF FREE THYROXINE: CPT | Performed by: PHYSICIAN ASSISTANT

## 2025-06-23 NOTE — ASSESSMENT & PLAN NOTE
Diabetes is controlled.  A1C is 6.8% today    Given concerns for potential weight loss defer increase Mounjaro at this time.  Rx: Continue Mounjaro 5 mg weekly, Jardiance 25 mg daily, metformin 1000 mg 1 tablet twice daily.     Foot exam done 3/2025 without sensation loss or ulceration.   Microalbumin:cr; normal, 4/24/2025  Eye exam; due 5/2026  LDL at goal <100; defer statin at this time  BP at goal <130/80    Discussed complications associated with diabetes and potential side effects with medications.  Encouraged continued effort toward lifestyle management:   Avoid concentrated sugar drinks, such as sodas   Routine physical activity; strength exercises

## 2025-06-23 NOTE — ASSESSMENT & PLAN NOTE
Mildly abnormal TSH with normal T4.  Clinically euthyroid.  Without thyroid enlargement on exam.  Repeat TFT today, TPO antibody.  Consider medication management if TSH greater than 10 or concerns for symptoms.

## 2025-06-23 NOTE — LETTER
June 23, 2025     Patient: Sada Ugalde   YOB: 1975   Date of Visit: 6/23/2025       To Whom It May Concern:    Sada Ugalde is currently taking Mounjaro (Tripeptide) 5 mg once weekly for management of diabetes. Please allow for bringing medication and blood sugar testing supplies with her during times of travel to avoid complications.     Sincerely,        Jennifer Irizarry PA-C    CC: No Recipients

## 2025-06-23 NOTE — PROGRESS NOTES
Chief Complaint   Patient presents with    Diabetes        HPI  Sada Ugalde is a 50 y.o. female presents today for follow-up evaluation of diabetes. They were last seen for this 3/2025.     Hemoglobin A1C   Date Value Ref Range Status   06/23/2025 6.8 (A) 4.5 - 5.7 % Final   04/24/2025 7.30 (H) 4.80 - 5.60 % Final   03/07/2025 7.4 (A) 4.5 - 5.7 % Final     Taking medications as prescribed and tolerating well.  Has noted did gradual weight loss with Mounjaro use.  Mild reduced appetite, but continues with frequent meals throughout the day focusing on protein and fiber intake.  She is not interested in continued weight loss over time for appearance.     - BG at home: 120-160s      -Denies hypoglycemia.   -Denies vision changes.   -Denies numbness.  Denies open sores.   -Denies heartburn/reflux, constipation, diarrhea, abdominal pain.  -Denies increased thirst or urination.   -Denies burning with urination.   -Denies SOB, chest pain.     LMP: 4 years ago;   HRT: Previously taking progesterone, topical estradiol and testosterone.  Previously [facility].  Taking magnesium, K2/D2, omega 3, D3 (started around 2024)      Type 2 Diabetes:   Diagnosed with diabetes: 2008; worse control after pregnancies   Complications: denies  Admits family history of diabetes.   C-peptide: 2.8, diabetes antibodies negative, 3/7/2025  Denies history of pancreatitis.  Denies family history of MENs, thyroid cancers.  Denies family history of thyroid disease    Current regimen includes: Mounjaro 5 mg, Jardiance 25 mg, metformin 1000 mg 1 tablet twice daily  Previous medications: glipizide   Compliance with medications is good.     DM Health Maintenance:  Ophthalmology: 5/2025; denies hx diabetic eye disease  Monofilament / Foot exam: 3/2025; without sensation loss or ulceration.   Urine microalbumin: cr: Normal, 4/24/2025  GFR: 102, 11/25/2024  LDL: 95, triglyceride: 98, 6/20/2024     Social Hx:  Diet: Meals: 3x/day; eating  more meals at home; working on portion control; mostly whole foods; working on increase protein intake; B: avocado, egg, caceres L: lentils, chicken, rice D: protein, vegetable, CHO Drinks: water, occasional coke regular few times per week; occasional 0-1 drink/month   Exercise: active most days; [employer] instructor    The following portions of the patient's history were reviewed and updated as appropriate: allergies, current medications, past family history, past medical history, past social history, past surgical history and problem list.    Past Medical History:   Diagnosis Date    Hypoglycemia     Sometimes    Knee sprain     Lumbosacral disc disease Back sx done    Testosterone deficiency     Applying tropical cream with estradiol    Type 2 diabetes mellitus     Vitamin D deficiency     Taking supplements     Past Surgical History:   Procedure Laterality Date    BACK SURGERY       SECTION      COLONOSCOPY      1very small polyup      Family History   Problem Relation Age of Onset    Cervical cancer Mother     Hypertension Mother         Had cervical cancer    Cancer Mother         She  of cervical cancer.    Heart attack Father         Dad had a stroke ans then heart attack.    Diabetes Father     Stroke Father     Diabetes Brother     Heart attack Brother         Had high blood pressure/ Diabetes    Breast cancer Neg Hx       Social History     Socioeconomic History    Marital status:    Tobacco Use    Smoking status: Never     Passive exposure: Past    Smokeless tobacco: Never    Tobacco comments:     Never   Vaping Use    Vaping status: Never Used   Substance and Sexual Activity    Alcohol use: Yes     Alcohol/week: 1.0 standard drink of alcohol     Types: 1 Glasses of wine per week     Comment: Very socail maybe once in 3-4 months.    Drug use: Never    Sexual activity: Yes     Partners: Male     Birth control/protection: None     Comment: Menopausal      No Known Allergies    Current Outpatient Medications on File Prior to Visit   Medication Sig Dispense Refill    calcium carbonate (OS-BANG) 600 MG tablet Take 1 tablet by mouth Daily.      ferrous gluconate (FERGON) 324 MG tablet Take 1 tablet by mouth Daily With Breakfast.      glucose blood (Glucose Meter Test) test strip Check blood sugars 3 times daily as needed. 100 each 5    glucose monitor monitoring kit Use 1 each As Needed (Check blood sugars 3 times daily as needed.). 1 each 0    Lancets misc Check blood sugars 3 times daily as needed. 100 each 5    magnesium chloride ER 64 MG DR tablet Take  by mouth Daily.      Omega 3 340 MG capsule delayed-release Take  by mouth.      vitamin C (ASCORBIC ACID) 250 MG tablet Take 1 tablet by mouth Daily.      Vitamin D-Vitamin K (VITAMIN D2 + K1 PO) Take  by mouth.      [DISCONTINUED] metFORMIN (GLUCOPHAGE) 1000 MG tablet Take 1 tablet by mouth 2 (Two) Times a Day With Meals. 180 tablet 3    [DISCONTINUED] Tirzepatide 5 MG/0.5ML solution auto-injector Inject 5 mg under the skin into the appropriate area as directed 1 (One) Time Per Week. 2 mL 5    [DISCONTINUED] empagliflozin (Jardiance) 25 MG tablet tablet Take 1 tablet by mouth Daily. 90 tablet 3    [DISCONTINUED] ondansetron ODT (ZOFRAN-ODT) 4 MG disintegrating tablet Place 1 tablet on the tongue Every 8 (Eight) Hours As Needed for Nausea or Vomiting. 30 tablet 3    [DISCONTINUED] Progesterone (PROMETRIUM) 100 MG capsule Take 2 capsules by mouth Daily. (Patient not taking: Reported on 4/16/2025)       No current facility-administered medications on file prior to visit.        Review of Systems   Constitutional:  Negative for activity change, appetite change, unexpected weight gain and unexpected weight loss.   Eyes:  Negative for visual disturbance.   Respiratory:  Negative for shortness of breath.    Cardiovascular:  Negative for chest pain.   Gastrointestinal:  Negative for abdominal pain, constipation and diarrhea.   Endocrine:  "Negative for polydipsia and polyuria.   Skin:  Negative for wound.   Neurological:  Negative for dizziness and numbness.        /68   Pulse 70   Ht 160 cm (62.99\")   Wt 64.9 kg (143 lb)   BMI 25.34 kg/m²      Physical Exam  Constitutional:       Appearance: Normal appearance.   Neck:      Thyroid: No thyroid mass, thyromegaly or thyroid tenderness.   Cardiovascular:      Rate and Rhythm: Normal rate.   Pulmonary:      Effort: Pulmonary effort is normal.   Musculoskeletal:         General: Normal range of motion.   Skin:     General: Skin is warm and dry.   Neurological:      General: No focal deficit present.      Mental Status: She is alert and oriented to person, place, and time.   Psychiatric:         Mood and Affect: Mood normal.         Behavior: Behavior normal.         LABS AND IMAGING  CMP:  Lab Results   Component Value Date    Glucose 137 (A) 06/23/2025    Glucose 114 (H) 02/10/2023    Glucose, UA >=1000 mg/dL (3+) (A) 04/24/2025    BUN 13 04/24/2025    BUN 12 02/10/2023    BUN/Creatinine Ratio 16.0 04/24/2025    Creatinine 0.81 04/24/2025    Creatinine 0.75 02/10/2023    Creatinine, Urine 47.6 04/24/2025    eGFR 88.6 04/24/2025    Ketones, UA Negative 04/24/2025    CO2 22.8 04/24/2025    Calcium 9.1 04/24/2025    Calcium 8.8 02/10/2023    Albumin 3.9 04/24/2025    AST (SGOT) 17 04/24/2025    ALT (SGPT) 14 04/24/2025     Lipid Panel:  Lab Results   Component Value Date    CHOL 193 04/24/2025    TRIG 72 04/24/2025    HDL 80 (H) 04/24/2025    VLDL 13 04/24/2025     04/24/2025     HbA1c:  Hemoglobin A1C   Date Value Ref Range Status   06/23/2025 6.8 (A) 4.5 - 5.7 % Final     Glucose:  Lab Results   Component Value Date    POCGLU 137 (A) 06/23/2025     Microalbumin:  Lab Results   Component Value Date    MALBCRERATIO  04/24/2025      Comment:      Unable to calculate     TSH:  Lab Results   Component Value Date    TSH 5.000 (H) 04/24/2025     Assessment and Plan    Diagnoses and all orders for " this visit:    1. Type 2 diabetes mellitus without complication, without long-term current use of insulin (Primary)  Assessment & Plan:  Diabetes is controlled.  A1C is 6.8% today    Given concerns for potential weight loss defer increase Mounjaro at this time.  Rx: Continue Mounjaro 5 mg weekly, Jardiance 25 mg daily, metformin 1000 mg 1 tablet twice daily.     Foot exam done 3/2025 without sensation loss or ulceration.   Microalbumin:cr; normal, 4/24/2025  Eye exam; due 5/2026  LDL at goal <100; defer statin at this time  BP at goal <130/80    Discussed complications associated with diabetes and potential side effects with medications.  Encouraged continued effort toward lifestyle management:   Avoid concentrated sugar drinks, such as sodas   Routine physical activity; strength exercises    Orders:  -     POC Glucose, Blood  -     POC Glycosylated Hemoglobin (Hb A1C)  -     empagliflozin (Jardiance) 25 MG tablet tablet; Take 1 tablet by mouth Daily.  Dispense: 90 tablet; Refill: 3  -     Tirzepatide 5 MG/0.5ML solution auto-injector; Inject 5 mg under the skin into the appropriate area as directed 1 (One) Time Per Week.  Dispense: 2 mL; Refill: 5  -     metFORMIN (GLUCOPHAGE) 1000 MG tablet; Take 1 tablet by mouth 2 (Two) Times a Day With Meals.  Dispense: 180 tablet; Refill: 3    2. Subclinical hypothyroidism  Assessment & Plan:  Mildly abnormal TSH with normal T4.  Clinically euthyroid.  Without thyroid enlargement on exam.  Repeat TFT today, TPO antibody.  Consider medication management if TSH greater than 10 or concerns for symptoms.      Orders:  -     T4, Free  -     TSH  -     Thyroid Peroxidase Antibody         Return in about 6 months (around 12/23/2025) for follow-up diabetes, follow-up thyroid disease. The patient was instructed to contact the clinic with any interval questions or concerns.    Electronically signed by: Jennifer Irizarry PA-C   Endocrinology     Please note that portions of this note were  completed with a voice recognition program.

## 2025-06-24 LAB — THYROPEROXIDASE AB SERPL-ACNC: 10 IU/ML (ref 0–34)
